# Patient Record
Sex: MALE | Race: ASIAN | Employment: OTHER | URBAN - METROPOLITAN AREA
[De-identification: names, ages, dates, MRNs, and addresses within clinical notes are randomized per-mention and may not be internally consistent; named-entity substitution may affect disease eponyms.]

---

## 2022-12-20 ENCOUNTER — HOSPITAL ENCOUNTER (OUTPATIENT)
Dept: PHYSICAL THERAPY | Age: 82
Discharge: HOME | End: 2022-12-20
Payer: MEDICARE

## 2022-12-20 PROCEDURE — 97535 SELF CARE MNGMENT TRAINING: CPT

## 2022-12-20 PROCEDURE — 97162 PT EVAL MOD COMPLEX 30 MIN: CPT

## 2022-12-20 NOTE — PROGRESS NOTES
[address] PHYSICAL THERAPY  [address] [place] [address] [address], [place], [address] - Phone: [phone]  Fax: [phone] / [address]  Patient Name: Jackie Miranda : 1940   Medical   Diagnosis: Right shoulder pain [M25.511] Treatment Diagnosis: Right shd RTC dysfunction   Onset Date: 2022     Referral Source: Melani Shearer* Start of Care [facility]): 2022   Prior Hospitalization: See medical history Provider #: [id number]   Prior Level of Function: Independent with ADLs and dressing, Amb cautious but no AD   Comorbidities: HTN, Hs of cardiac stent, [place], DM   Medications: Verified on Patient Summary List   The Plan of Care and following information is based on the information from the initial evaluation.   ==========================================================================================  Assessment / key information:  Pt is an [de-identified]year old male who presents to PT today with c/o increased right shd pain with AROM after attempting to place two grocery bags from the  seat into the passenger seat. Due to only mild improvements, pt went to ortho for assessment, x-rays negative. S/S consistent today with RTC dysfunction and will trial conservative PT x6 weeks prior to follow-up with MD for possible MRI. Pt would benefit from skilled PT to address increased pain and decreased strength and ROM limiting dressing tasks and ADLs.   Minimal TTP-referral pain to deltoid                                                       AROM                                           PROM    Left Right   Right   Flexion 145 70 Flexion 140deg   Extension 60 42 Extension NT   Scaption/ 53 Scaption/ABD 90   ER @ 0 Degrees 60 38 ER @ 0 Degrees 50   IR Behind Back T10 NT IR @ 90 Degrees NT    Strength:                                                                      L (1-5) R (1-5) Pain   Flexion 4- NT [x] Yes   [] No   Abduction 4 NT [x] Yes   [] No   External Rotation 4 3 [x] Yes   [] No   Internal Rotation 4 4 [] Yes   [] No   Elbow Flexion 4+ 4+ [] Yes   [] No   Elbow Extension 4+ 4+ [] Yes   [] No     FOTO score 50 indicating 50% functional disability   ==========================================================================================  Eval Complexity: History: HIGH Complexity :3+ comorbidities / personal factors will impact the outcome/ POC Exam:MEDIUM Complexity : 3 Standardized tests and measures addressing body structure, function, activity limitation and / or participation in recreation  Presentation: MEDIUM Complexity : Evolving with changing characteristics  Clinical Decision Making:MEDIUM Complexity : FOTO score of 26-74Overall Complexity:MEDIUM    Problem List: pain affecting function, decrease ROM, decrease strength, decrease ADL/ functional abilitiies, decrease activity tolerance, decrease flexibility/ joint mobility, and decrease transfer abilities   Treatment Plan may include any combination of the following: Therapeutic exercise, Neuromuscular reeducation, Manual therapy, Therapeutic activity, Self care/home management, and Electric stim unattended   Patient / Family readiness to learn indicated by: asking questions, trying to perform skills, and interest  Persons(s) to be included in education: patient (P) and family support person (FSP);list daughter  Barriers to Learning/Limitations: yes;  sensory deficits-hearing  Measures taken: NA   Patient Goal (s): \"Stop the pain when using the arm to reach overhead and sleepin belen the right side\"   Patient self reported health status: fair  Rehabilitation Potential: fair  Short Term Goals: To be accomplished in  2  weeks:  1. Pt will be compliant with HEP for symptom management at home. 2. Pt will demo right shd flex AROM to >/100deg for increased ease reaching to cabinet  3.  Pt will increase right shd abd AROM to >/= 90 deg for incresaed ease dressing. Long Term Goals: To be accomplished in  4  weeks:  1. Pt will be independent with HEP at D/C for self management. 2. Pt will increased right shd ER MMT to >/= 4/5 for increased ease dressing. 3. Pt will increase FS FOTO Score to >/= 60 to indicate a significant decrease in functional disability. Frequency / Duration:   Patient to be seen  2-3  times per week for 4  weeks:  Patient / Caregiver education and instruction: self care, activity modification, and exercises    Therapist Signature: Angel Matamoros DPT Date: 22/65/9820   Certification Period: 12/20/22-3/19/22 Time: 10:03 AM   ===========================================================================================  I certify that the above Physical Therapy Services are being furnished while the patient is under my care. I agree with the treatment plan and certify that this therapy is necessary. Physician Signature:        Date:       Time:     Please sign and return to [facility] or you may fax the signed copy to [phone]. Thank you.

## 2022-12-20 NOTE — PROGRESS NOTES
PHYSICAL THERAPY - DAILY TREATMENT NOTE    Patient Name: Rashawn Sanchez        Date: 2022  : 1940   YES Patient  Verified  Visit #:     Insurance: Payor: Antoinette Yusuf / Plan: [address] [place] [address] HMO / Product Type: Managed Care Medicare /      In time: 10:15 Out time: 11:15   Total Treatment Time: 60     Medicare/BCBS [place] Time Tracking (below)   Total Timed Codes (min):  8 1:1 Treatment Time:  8     TREATMENT AREA =  Right shd    SUBJECTIVE    Pain Level (on 0 to 10 scale):  0  / 10   Medication Changes/New allergies or changes in medical history, any new surgeries or procedures?     NO    If yes, update Summary List   Subjective Functional Status/Changes:  []  No changes reported     Tried to lift two heavy grocery bags from  seat to passenger seat   Sharp pain  Early November  X-ray negative   No pain medication or injection, Volaren cream helps some  No heat of ice  Slightly better  Pain with reaching up and liein belen that side  Noticing some ROM improvement   Getting dressed difficulty, Right handed   Reading        OBJECTIVE    8 min Self Care  [x]  See flow sheet   Rationale:      increase ROM and impove ADL tolerance  to improve the patients ability to per ADLs with less discomfort      min Patient Education:  YES  Reviewed HEP   []  Progressed/Changed HEP based on:   Issued HEP     Other Objective/Functional Measures:  Minimal TTP-referral pain to deltoid          AROM                                           PROM   Left Right  Right   Flexion 145 70 Flexion 140deg   Extension 60 42 Extension NT   Scaption/ 53 Scaption/ABD 90   ER @ 0 Degrees 60 38 ER @ 0 Degrees 50   IR Behind Back T10 NT IR @ 90 Degrees NT    Strength:                                                                     L (1-5) R (1-5) Pain   Flexion 4- NT [x] Yes   [] No   Abduction 4 NT [x] Yes   [] No   External Rotation 4 3 [x] Yes   [] No   Internal Rotation 4 4 [] Yes   [] No Elbow Flexion 4+ 4+ [] Yes   [] No   Elbow Extension 4+ 4+ [] Yes   [] No            Post Treatment Pain Level (on 0 to 10) scale:   0  / 10     ASSESSMENT    Assessment/Changes in Function:     Justification for Eval Code Complexity: Moderate  Patient History (low 0, mod 1-2, high 3-4): High   Examination (low 1-2, mod 3+, high 4+): Moderate   Clinical Presentation (low: stable/uncomplicated; mod: evolving; high: unstable/unpredictable): Moderate  Clinical Decision Making (low , mod 26-74, high 1-25): Moderate         []  See Progress Note/Recertification   Patient will continue to benefit from skilled PT services to analyze,, cue,, progress,, modify,, demonstrate,, instruct, and address, movement patterns,, therapeutic interventions,, postural abnormalities,, soft tissue restrictions,, ROM,, strength,, functional mobility,, body mechanics/ergonomics, and home and community integration, to attain remaining goals. Progress toward goals / Updated goals:    Goals set per POC     PLAN    []  Upgrade activities as tolerated YES Continue plan of care   []  Discharge due to :    []  Other:      Therapist: Shant Kwon DPT    Date: 12/20/2022 Time: 10:02 AM   No future appointments.

## 2022-12-23 ENCOUNTER — HOSPITAL ENCOUNTER (OUTPATIENT)
Dept: PHYSICAL THERAPY | Age: 82
Discharge: HOME | End: 2022-12-23
Payer: MEDICARE

## 2022-12-23 PROCEDURE — 97110 THERAPEUTIC EXERCISES: CPT

## 2022-12-23 NOTE — PROGRESS NOTES
PHYSICAL THERAPY - DAILY TREATMENT NOTE    Patient Name: Ronnie Ulloa        Date: 2022  : 1940   YES Patient  Verified  Visit #:     Insurance: Payor: Woodrow Rees / Plan: [address] [place] [address] HMO / Product Type: Managed Care Medicare /      In time: 9:00 Out time: 9:45   Total Treatment Time: 45     Medicare/BCBS [place] Time Tracking (below)   Total Timed Codes (min):  45 1:1 Treatment Time:  45     TREATMENT AREA =  Right shd    SUBJECTIVE    Pain Level (on 0 to 10 scale):  0  / 10   Medication Changes/New allergies or changes in medical history, any new surgeries or procedures? NO    If yes, update Summary List   Subjective Functional Status/Changes:  []  No changes reported     \"I feel like it is a little better\"          OBJECTIVE    45 min Therapeutic Exercise:  [x]  See flow sheet   Rationale:      increase ROM and increase strength to improve the patients ability to Maximize functional mobility         min Patient Education:  YES  Reviewed HEP   []  Progressed/Changed HEP based on:   Issued HEP     Other Objective/Functional Measures:  Pt reported he liked the puleys and could feel a good stretch  Only mild c/o pain with eccentric lowering during cane flex, abd bree, and side lying ER  Instructed pt to decreased force of contraction and decrease ROM to avoid discomfort     Post Treatment Pain Level (on 0 to 10) scale:   0  / 10     ASSESSMENT    Assessment/Changes in Function:     Tolerated first session well     []  See Progress Note/Recertification   Patient will continue to benefit from skilled PT services to analyze, cue, progress, modify,, demonstrate, instruct, and address, movement patterns, therapeutic interventions, postural abnormalities, soft tissue restrictions, ROM, strength, functional mobility, body mechanics/ergonomics, and home and community integration, to attain remaining goals.    Progress toward goals / Updated goals:    Issued HEP PLAN    []  Upgrade activities as tolerated YES Continue plan of care   []  Discharge due to :    []  Other:      Therapist: Chang Vicente DPT    Date: 12/23/2022 Time: 11:37 AM     Future Appointments   Date Time Provider Christina Rosai   12/27/2022  1:15 PM Payal Cates, PT [facility]   12/29/2022  1:15 PM Payal Cates, PT [facility]   1/3/2023 11:30 AM Payal Cates, PT [facility]   1/5/2023  9:30 AM Payal Cates, PT [facility]   1/6/2023 10:00 AM Payal Cates, PT [facility]   1/9/2023 11:00 AM Payal Cates, PT [facility]   1/11/2023  1:00 PM Christopher Yarbrough PT [facility]   1/13/2023 10:00 AM Payal Cates, PT [facility]   1/16/2023 11:30 AM Payal Cates PT [facility]   1/18/2023  9:30 AM Payal Cates PT [facility]   1/20/2023 10:00 AM Payal Cates PT [facility]   1/23/2023  9:30 AM Payal Cates PT [facility]   1/25/2023 11:00 AM Payal Cates PT [facility]   1/27/2023 10:00 AM Payal Cates PT [facility]

## 2022-12-27 ENCOUNTER — HOSPITAL ENCOUNTER (OUTPATIENT)
Dept: PHYSICAL THERAPY | Age: 82
Discharge: HOME | End: 2022-12-27
Payer: MEDICARE

## 2022-12-27 PROCEDURE — 97530 THERAPEUTIC ACTIVITIES: CPT

## 2022-12-27 PROCEDURE — 97110 THERAPEUTIC EXERCISES: CPT

## 2022-12-27 NOTE — PROGRESS NOTES
PHYSICAL THERAPY - DAILY TREATMENT NOTE    Patient Name: Adalberto Oden        Date: 2022  : 1940   YES Patient  Verified  Visit #:   3   of   12  Insurance: Payor: Dawna Scott / Plan: [address] [place] [address] HMO / Product Type: Managed Care Medicare /      In time: 1:05 Out time: 1:50   Total Treatment Time: 45     Medicare/BCBS Time Tracking (below)   Total Timed Codes (min):  45 1:1 Treatment Time:  45     TREATMENT AREA = Right shoulder pain [M25.511]    SUBJECTIVE    Pain Level (on 0 to 10 scale):  0  / 10   Medication Changes/New allergies or changes in medical history, any new surgeries or procedures? NO    If yes, update Summary List   Subjective Functional Status/Changes:  []  No changes reported     \"It's feeling a lot better even after just one session\"          OBJECTIVE     Therapeutic Procedures:  Min Procedure Specifics + Rationale   n/a [x]  Patient Education (performed throughout session) [x] Review HEP    [] Progressed/Changed HEP based on:   [] proper performance and advancement of Therex/TA   [] reduction in pain level    [] increased functional capacity       [] change in directional preference   35 [x] Therapeutic Exercise   [x]  See Flowsheet   Rationale: increase ROM and increase strength to improve the patients ability to participate in ADL's    10 [x] Therapeutic Activity   [x]  See Flowsheet    Rationale: To improve safety, proprioception, coordination, and efficiency with tasks           Other Objective/Functional Measures: Added and advanced therex per flow sheet.        Post Treatment Pain Level (on 0 to 10) scale:   0  / 10     ASSESSMENT    Assessment/Changes in Function:       Performed all new therex well without notable complaints         Patient will continue to benefit from skilled PT services to modify and progress therapeutic interventions, address functional mobility deficits, address ROM deficits, address strength deficits, analyze and address soft tissue restrictions, analyze and cue movement patterns, analyze and modify body mechanics/ergonomics, and instruct in home and community integration  to attain remaining goals   Progress toward goals / Updated goals:    Responding well to treatment protocol as patient reports no notable pain     PLAN    [x]  Upgrade activities as tolerated  [x]  Update interventions per flow sheet YES Continue plan of care   []  Discharge due to :    []  Other:      Therapist: Tapan Members \"BJ\" Elsa, LEWIST, Cert. MDT, Cert. DN, Cert. SMT, Dip.  [employer]    Date: 12/27/2022 Time: 1:21 PM     Future Appointments   Date Time Provider Christina Hawk   12/29/2022  1:15 PM Jignesh Marsh, PT [facility]   1/3/2023 11:30 AM Jignesh Marsh, PT [facility]   1/5/2023  9:30 AM Jignesh Marsh, PT [facility]   1/6/2023 10:00 AM Jignesh Marsh, PT [facility]   1/9/2023 11:00 AM Jignesh Marsh, PT [facility]   1/11/2023  1:00 PM Allison Carlos PT [facility]   1/13/2023 10:00 AM Jignesh Marsh, PT [facility]   1/16/2023 11:30 AM Jignesh Marsh PT [facility]   1/18/2023  9:30 AM Jignesh Marsh, PT [facility]   1/20/2023 10:00 AM Jignesh Marsh PT [facility]   1/23/2023  9:30 AM Jignesh Marsh, PT [facility]   1/25/2023 11:00 AM ZOFIA Hampton [facility]   1/27/2023 10:00 AM ZOFIA Hampton [facility]

## 2022-12-29 ENCOUNTER — HOSPITAL ENCOUNTER (OUTPATIENT)
Dept: PHYSICAL THERAPY | Age: 82
Discharge: HOME | End: 2022-12-29
Payer: MEDICARE

## 2022-12-29 PROCEDURE — 97530 THERAPEUTIC ACTIVITIES: CPT

## 2022-12-29 PROCEDURE — 97110 THERAPEUTIC EXERCISES: CPT

## 2022-12-29 NOTE — PROGRESS NOTES
PHYSICAL THERAPY - DAILY TREATMENT NOTE    Patient Name: Lito Bolton        Date: 2022  : 1940   YES Patient  Verified  Visit #:     Insurance: Payor: Zoltan Holland / Plan: [address] [place] [address] HMO / Product Type: Managed Care Medicare /      In time: 1:05 Out time: 1:45   Total Treatment Time: 40     Medicare/BCBS Time Tracking (below)   Total Timed Codes (min):  40 1:1 Treatment Time:  40     TREATMENT AREA = Right shoulder pain [M25.511]    SUBJECTIVE    Pain Level (on 0 to 10 scale):  0  / 10   Medication Changes/New allergies or changes in medical history, any new surgeries or procedures? NO    If yes, update Summary List   Subjective Functional Status/Changes:  []  No changes reported     \"The exercises feel good. Especially the pulleys. I'm leaving to visit my niece in [place] tomorrow for [employer]. \"          OBJECTIVE     Therapeutic Procedures:  Min Procedure Specifics + Rationale   n/a [x]  Patient Education (performed throughout session) [x] Review HEP    [] Progressed/Changed HEP based on:   [] proper performance and advancement of Therex/TA   [] reduction in pain level    [] increased functional capacity       [] change in directional preference   30 [x] Therapeutic Exercise   [x]  See Flowsheet   Rationale: increase ROM and increase strength to improve the patients ability to participate in ADL's    10 [x] Therapeutic Activity   [x]  See Flowsheet    Rationale: To improve safety, proprioception, coordination, and efficiency with tasks           Other Objective/Functional Measures:    Increased reps/sets/resistance per flow sheet.        Post Treatment Pain Level (on 0 to 10) scale:   0  / 10     ASSESSMENT    Assessment/Changes in Function:       Performed familiar therex well without complaints         Patient will continue to benefit from skilled PT services to modify and progress therapeutic interventions, address functional mobility deficits, address ROM deficits, address strength deficits, analyze and address soft tissue restrictions, analyze and cue movement patterns, analyze and modify body mechanics/ergonomics, and instruct in home and community integration  to attain remaining goals   Progress toward goals / Updated goals:    Good carryover in relief between sessions to facilitate ADL tolerance     PLAN    [x]  Upgrade activities as tolerated  [x]  Update interventions per flow sheet YES Continue plan of care   []  Discharge due to :    []  Other:      Therapist: Rosa Parnell \"BJ\" Elsa, LALO, Cert. MDT, Cert. DN, Cert. SMT, Dip.  Osteopractic    Date: 12/29/2022 Time: 1:22 PM     Future Appointments   Date Time Provider Christina Hawk   1/3/2023 11:30 AM Med Brower, PT [facility]   1/5/2023  9:30 AM Med Brower, PT [facility]   1/6/2023 10:00 AM Med Brower, PT [facility]   1/9/2023 11:00 AM Med Brower PT [facility]   1/11/2023  1:00 PM Iram Escalera, PT [facility]   1/13/2023 10:00 AM Med Brower PT [facility]   1/16/2023 11:30 AM Med Brower PT [facility]   1/18/2023  9:30 AM Med Brower PT [facility]   1/20/2023 10:00 AM Med Brower PT [facility]   1/23/2023  9:30 AM Med Brower PT [facility]   1/25/2023 11:00 AM Med Brower, PT [facility]   1/27/2023 10:00 AM ZOFIA Cole [facility]

## 2023-01-03 ENCOUNTER — HOSPITAL ENCOUNTER (OUTPATIENT)
Dept: PHYSICAL THERAPY | Age: 83
Discharge: HOME | End: 2023-01-03
Payer: MEDICARE

## 2023-01-03 PROCEDURE — 97530 THERAPEUTIC ACTIVITIES: CPT

## 2023-01-03 PROCEDURE — 97110 THERAPEUTIC EXERCISES: CPT

## 2023-01-03 NOTE — PROGRESS NOTES
PHYSICAL THERAPY - DAILY TREATMENT NOTE    Patient Name: Justo Carpenter        Date: 1/3/2023  : 1940   YES Patient  Verified  Visit #:     Insurance: Payor: Hans Racer / Plan: [place] MED[place] PART A & B / Product Type: Medicare /      In time: 11:31 Out time: 12:10   Total Treatment Time: 39     Medicare/BCBS Time Tracking (below)   Total Timed Codes (min):  39 1:1 Treatment Time:  39     TREATMENT AREA = Right shoulder pain [M25.511]    SUBJECTIVE    Pain Level (on 0 to 10 scale):  0  / 10   Medication Changes/New allergies or changes in medical history, any new surgeries or procedures? NO    If yes, update Summary List   Subjective Functional Status/Changes:  []  No changes reported     \"The trip to [place] was fine. I didn't have any extra pain in my shoulder\"          OBJECTIVE     Therapeutic Procedures:  Min Procedure Specifics + Rationale   n/a [x]  Patient Education (performed throughout session) [x] Review HEP    [] Progressed/Changed HEP based on:   [] proper performance and advancement of Therex/TA   [] reduction in pain level    [] increased functional capacity       [] change in directional preference   23 [x] Therapeutic Exercise   [x]  See Flowsheet   Rationale: increase ROM and increase strength to improve the patients ability to participate in ADL's    16 [x] Therapeutic Activity   [x]  See Flowsheet    Rationale: To improve safety, proprioception, coordination, and efficiency with tasks         Other Objective/Functional Measures: Added and advanced therex per flow sheet. Increased reps/sets/resistance per flow sheet. Transient pain during wall wash     Post Treatment Pain Level (on 0 to 10) scale:   0  / 10     ASSESSMENT    Assessment/Changes in Function:       Performed/participated in treatment well without complaints aside from muscular fatigue/deconditioning, indicating (+) response to current course of treatment to further improve functional status. Patient will continue to benefit from skilled PT services to modify and progress therapeutic interventions, address functional mobility deficits, address ROM deficits, address strength deficits, analyze and address soft tissue restrictions, analyze and cue movement patterns, analyze and modify body mechanics/ergonomics, and instruct in home and community integration  to attain remaining goals   Progress toward goals / Updated goals:    Good carryover in relief. PLAN    [x]  Upgrade activities as tolerated  [x]  Update interventions per flow sheet YES Continue plan of care   []  Discharge due to :    []  Other:      Therapist: Finn Betancourt \"BJ\" Elsa, DPT, Cert. MDT, Cert. DN, Cert. SMT, Dip.  Osteopractic    Date: 1/3/2023 Time: 11:29 AM     Future Appointments   Date Time Provider Christina Hawk   1/3/2023 11:30 AM Evonne Collins, PT [facility]   1/5/2023  9:30 AM Evonne Collins, PT [facility]   1/6/2023 10:00 AM Evonne Collins, PT [facility]   1/9/2023 11:00 AM Evonne Collins, PT [facility]   1/11/2023  1:00 PM Claudia Shelley PT [facility]   1/13/2023 10:00 AM Evonne Collins, PT [facility]   1/16/2023 11:30 AM Evonne Collins PT [facility]   1/18/2023  9:30 AM Evonne Collins PT [facility]   1/20/2023 10:00 AM Evonne Collins PT [facility]   1/23/2023  9:30 AM Evonne Collins, PT [facility]   1/25/2023 11:00 AM ZOFIA Brooke [facility]   1/27/2023 10:00 AM ZOFIA Brooke [facility]

## 2023-01-05 ENCOUNTER — HOSPITAL ENCOUNTER (OUTPATIENT)
Dept: PHYSICAL THERAPY | Age: 83
Discharge: HOME | End: 2023-01-05
Payer: MEDICARE

## 2023-01-05 PROCEDURE — 97140 MANUAL THERAPY 1/> REGIONS: CPT

## 2023-01-05 PROCEDURE — 97014 ELECTRIC STIMULATION THERAPY: CPT

## 2023-01-05 PROCEDURE — 97110 THERAPEUTIC EXERCISES: CPT

## 2023-01-05 NOTE — PROGRESS NOTES
PHYSICAL THERAPY - DAILY TREATMENT NOTE    Patient Name: Regi Milian        Date: 2023  : 1940   YES Patient  Verified  Visit #:     Insurance: Payor: Domingo Alt / Plan: [place] MEDICARE PART A & B / Product Type: Medicare /      In time: 9:25 Out time: 10:20   Total Treatment Time: 55     Medicare/BCBS Time Tracking (below)   Total Timed Codes (min):  55 1:1 Treatment Time:  38     TREATMENT AREA = Right shoulder pain [M25.511]    SUBJECTIVE    Pain Level (on 0 to 10 scale):  5  / 10   Medication Changes/New allergies or changes in medical history, any new surgeries or procedures? NO    If yes, update Summary List   Subjective Functional Status/Changes:  []  No changes reported     \"I'm very sore from last time. \"          OBJECTIVE     Therapeutic Procedures:  Min Procedure Specifics + Rationale   n/a [x]  Patient Education (performed throughout session) [x] Review HEP    [] Progressed/Changed HEP based on:   [] proper performance and advancement of Therex/TA   [] reduction in pain level    [] increased functional capacity       [] change in directional preference   25 [x] Therapeutic Exercise   [x]  See Flowsheet   Rationale: increase ROM and increase strength to improve the patients ability to participate in ADL's    15 [x]  Manual Therapy       [] IASTM -   [x] STM to periscapular mm; [address] G3 GH Distraction; G3-4 inferior/posterior mobs;  PROM all planes; Scapular PNF; Shoulder PNF Patterns; Rhythmic Stabilization. Rationale: decrease pain, increase ROM, increase tissue extensibility, decrease trigger points and increase postural awareness to attain functional use and participation with ADL's  [x] Skin assessment post-treatment:  [x]intact [x]redness- no adverse reaction   []redness - adverse  The manual therapy interventions were performed at a separate and distinct time from the therapeutic activities interventions.          Modality rationale: decrease inflammation, decrease pain, increase tissue extensibility and increase muscle contraction/control to improve the patients ability to perform ADL's with greater ease     Min Type Additional Details   15 [x] E-Stim Type:Symmetrical Biphasic  Attended? 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 Post Treatment Pain Level (on 0 to 10) scale:   0  / 10     ASSESSMENT    Assessment/Changes in Function:       Responded well to manual as patient reported reduction in pain with performance. Patient will continue to benefit from skilled PT services to modify and progress therapeutic interventions, address functional mobility deficits, address ROM deficits, address strength deficits, analyze and address soft tissue restrictions, analyze and cue movement patterns, analyze and modify body mechanics/ergonomics, and instruct in home and community integration  to attain remaining goals   Progress toward goals / Updated goals:    Despite exacerbation, right shoulder AROM flexion has improved      PLAN    [x]  Upgrade activities as tolerated  [x]  Update interventions per flow sheet YES Continue plan of care   []  Discharge due to :    []  Other:      Therapist: Allan Vaughan \"BJ\" LALO Hunter, Cert. MDT, Cert. DN, Cert. SMT, Dip.  Osteopractic    Date: 1/5/2023 Time: 9:33 AM     Future Appointments   Date Time Provider Christina Hawk   1/6/2023 10:00 AM Bar Botello PT [facility]   1/9/2023 11:00 AM Bar Botello PT [facility]   1/11/2023  1:00 PM Brijesh Rodriguez PT [facility]   1/13/2023 10:00 AM Bar Botello PT [facility]   1/16/2023 11:30 AM [facility] 1/18/2023  9:30 AM Renea Sweeney, PT [facility]   1/20/2023 10:00 AM Renea Sweeney, PT [facility]   1/23/2023  9:30 AM Renea Sweeney, PT [facility]   1/25/2023 11:00 AM Renea Sweeney, PT [facility]   1/27/2023 10:00 AM Renea Sweeney, PT [facility]

## 2023-01-06 ENCOUNTER — HOSPITAL ENCOUNTER (OUTPATIENT)
Dept: PHYSICAL THERAPY | Age: 83
Discharge: HOME | End: 2023-01-06
Payer: MEDICARE

## 2023-01-06 PROCEDURE — 97014 ELECTRIC STIMULATION THERAPY: CPT

## 2023-01-06 PROCEDURE — 97140 MANUAL THERAPY 1/> REGIONS: CPT

## 2023-01-06 PROCEDURE — 97110 THERAPEUTIC EXERCISES: CPT

## 2023-01-06 NOTE — PROGRESS NOTES
PHYSICAL THERAPY - DAILY TREATMENT NOTE    Patient Name: Tiana Chinchilla        Date: 2023  : 1940   YES Patient  Verified  Visit #:     Insurance: Payor: Breana Rush / Plan: [place] MEDICARE PART A & B / Product Type: Medicare /      In time: 9:55 Out time: 10:55   Total Treatment Time: 60     Medicare/BCBS Time Tracking (below)   Total Timed Codes (min):  60 1:1 Treatment Time:  45     TREATMENT AREA = Right shoulder pain [M25.511]    SUBJECTIVE    Pain Level (on 0 to 10 scale):  3  / 10   Medication Changes/New allergies or changes in medical history, any new surgeries or procedures? NO    If yes, update Summary List   Subjective Functional Status/Changes:  []  No changes reported     \"It's better than before. Not as sore. The ice helped last time. \"           OBJECTIVE     Therapeutic Procedures:  Min Procedure Specifics + Rationale   n/a [x]  Patient Education (performed throughout session) [x] Review HEP    [] Progressed/Changed HEP based on:   [] proper performance and advancement of Therex/TA   [] reduction in pain level    [] increased functional capacity       [] change in directional preference   30 [x] Therapeutic Exercise   [x]  See Flowsheet   Rationale: increase ROM and increase strength to improve the patients ability to participate in ADL's    15 [x]  Manual Therapy       [] IASTM -   [x] STM to periscapular mm; [address] G3 GH Distraction; G3-4 inferior/posterior mobs;  PROM all planes; Scapular PNF; Shoulder PNF Patterns; Rhythmic Stabilization. Rationale: decrease pain, increase ROM, increase tissue extensibility, decrease trigger points and increase postural awareness to attain functional use and participation with ADL's  [x] Skin assessment post-treatment:  [x]intact [x]redness- no adverse reaction   []redness - adverse  The manual therapy interventions were performed at a separate and distinct time from the therapeutic activities interventions.          Modality rationale: decrease inflammation, decrease pain, increase tissue extensibility and increase muscle contraction/control to improve the patients ability to perform ADL's with greater ease     Min Type Additional Details   15 [x] E-Stim Type:Symmetrical Biphasic  Attended? NO Location: right shoulder  [x] supine              [] prone  [] legs elevated   [] legs flat  [] sitting   [] sidelying - [] left [] right  [] with heat  [x] with ice  [] Vasopneumatic Device    [x] Skin assessment post-treatment:  [x]intact [x]redness- no adverse reaction       []redness - adverse reaction:       Other Objective/Functional Measures:    Trial of repeated shoulder extension     Post Treatment Pain Level (on 0 to 10) scale:   0  / 10     ASSESSMENT    Assessment/Changes in Function:       Responds well to repeated movements         Patient will continue to benefit from skilled PT services to modify and progress therapeutic interventions, address functional mobility deficits, address ROM deficits, address strength deficits, analyze and address soft tissue restrictions, analyze and cue movement patterns, analyze and modify body mechanics/ergonomics, and instruct in home and community integration  to attain remaining goals   Progress toward goals / Updated goals:    Good progress in overhead reaching tolerance     PLAN    [x]  Upgrade activities as tolerated  [x]  Update interventions per flow sheet YES Continue plan of care   []  Discharge due to :    []  Other:      Therapist: Leonard Kessler \"BJ\" Callie Holt DPT, Cert. MDT, Cert. DN, Cert. SMT, Dip.  Osteopractic    Date: 1/6/2023 Time: 10:12 AM     Future Appointments   Date Time Provider Christina Hawk   1/9/2023 11:00 AM Marisabel Grover PT [facility]   1/11/2023  1:00 PM Cecily Spatz, PT [facility]   1/13/2023 10:00 AM Marisabel Grover PT [facility]   1/16/2023 11:30 AM Marisabel Grover PT [facility]   1/18/2023  9:30 AM Marisabel Grover PT [facility]   1/20/2023 10:00 AM Marisabel Grover PT [facility] 1/23/2023  9:30 AM Erum Jade PT [facility]   1/25/2023 11:00 AM ZOFIA Nicholson [facility]   1/27/2023 10:00 AM ZOFIA Nicholson [facility]

## 2023-01-09 ENCOUNTER — HOSPITAL ENCOUNTER (OUTPATIENT)
Dept: PHYSICAL THERAPY | Age: 83
Discharge: HOME | End: 2023-01-09
Payer: MEDICARE

## 2023-01-09 PROCEDURE — 97110 THERAPEUTIC EXERCISES: CPT

## 2023-01-09 PROCEDURE — 97014 ELECTRIC STIMULATION THERAPY: CPT

## 2023-01-09 PROCEDURE — 97140 MANUAL THERAPY 1/> REGIONS: CPT

## 2023-01-09 NOTE — PROGRESS NOTES
PHYSICAL THERAPY - DAILY TREATMENT NOTE    Patient Name: Marika Tijerina        Date: 2023  : 1940   YES Patient  Verified  Visit #:     Insurance: Payor: Sydnie Nones / Plan: [place] MEDICARE PART A & B / Product Type: Medicare /      In time: 10:50 Out time: 11:50   Total Treatment Time: 60     Medicare/BCBS Time Tracking (below)   Total Timed Codes (min):  60 1:1 Treatment Time:  45     TREATMENT AREA = Right shoulder pain [M25.511]    SUBJECTIVE    Pain Level (on 0 to 10 scale):  3  / 10   Medication Changes/New allergies or changes in medical history, any new surgeries or procedures? NO    If yes, update Summary List   Subjective Functional Status/Changes:  []  No changes reported     \"It's better but comes back when I reach\"          OBJECTIVE     Therapeutic Procedures:  Min Procedure Specifics + Rationale   n/a [x]  Patient Education (performed throughout session) [x] Review HEP    [] Progressed/Changed HEP based on:   [] proper performance and advancement of Therex/TA   [] reduction in pain level    [] increased functional capacity       [] change in directional preference   30 [x] Therapeutic Exercise   [x]  See Flowsheet   Rationale: increase ROM and increase strength to improve the patients ability to participate in ADL's    15 [x]  Manual Therapy       [] IASTM -   [x] STM to periscapular mm; [address] G3 GH Distraction; G3-4 inferior/posterior mobs;  PROM all planes; Scapular PNF; Shoulder PNF Patterns; Rhythmic Stabilization. Rationale: decrease pain, increase ROM, increase tissue extensibility, decrease trigger points and increase postural awareness to attain functional use and participation with ADL's  [x] Skin assessment post-treatment:  [x]intact [x]redness- no adverse reaction   []redness - adverse  The manual therapy interventions were performed at a separate and distinct time from the therapeutic activities interventions.          Modality rationale: decrease inflammation, decrease pain, increase tissue extensibility and increase muscle contraction/control to improve the patients ability to perform ADL's with greater ease     Min Type Additional Details   15 [x] E-Stim Type:Symmetrical Biphasic  Attended? NO Location: right shoulder  [x] supine              [] prone  [x] legs elevated   [] legs flat  [] sitting   [] sidelying - [] left [] right  [] with heat  [x] with ice  [] Vasopneumatic Device    [x] Skin assessment post-treatment:  [x]intact [x]redness- no adverse reaction       []redness - adverse reaction:       Other Objective/Functional Measures:    Increased reps/sets/resistance per flow sheet. Post Treatment Pain Level (on 0 to 10) scale:   0-1  / 10     ASSESSMENT    Assessment/Changes in Function:       Improved ER tolerance during manual         Patient will continue to benefit from skilled PT services to modify and progress therapeutic interventions, address functional mobility deficits, address ROM deficits, address strength deficits, analyze and address soft tissue restrictions, analyze and cue movement patterns, analyze and modify body mechanics/ergonomics, and instruct in home and community integration  to attain remaining goals   Progress toward goals / Updated goals:    Flexion tolerance continues to improve     PLAN    [x]  Upgrade activities as tolerated  [x]  Update interventions per flow sheet YES Continue plan of care   []  Discharge due to :    []  Other:      Therapist: Anaid Burrows \"BJ\" Nicolas Ann, LEWIST, Cert. MDT, Cert. DN, Cert. SMT, Dip.  Osteopractic    Date: 1/9/2023 Time: 10:53 AM     Future Appointments   Date Time Provider Christina Hawk   1/9/2023 11:00 AM Nathan Ly, PT [facility]   1/11/2023  1:00 PM Carlos Rodriguez PT [facility]   1/13/2023 10:00 AM Nathan Ly, PT [facility]   1/16/2023 11:30 AM Nathan Ly PT [facility]   1/18/2023  9:30 AM Nathan Ly, PT [facility]   1/20/2023 10:00 AM Nathan Ly, [facility] 1/23/2023  9:30 AM Demetrius Reyes PT [facility]   1/25/2023 11:00 AM ZOFIA Mclaughlin [facility]   1/27/2023 10:00 AM ZOFIA Mclaughlin [facility]

## 2023-01-11 ENCOUNTER — HOSPITAL ENCOUNTER (OUTPATIENT)
Dept: PHYSICAL THERAPY | Age: 83
Discharge: HOME | End: 2023-01-11
Payer: MEDICARE

## 2023-01-11 PROCEDURE — 97110 THERAPEUTIC EXERCISES: CPT

## 2023-01-11 NOTE — PROGRESS NOTES
PHYSICAL THERAPY - DAILY TREATMENT NOTE    Patient Name: Winifred Cannon        Date: 2023  : 1940   YES Patient  Verified  Visit #:     Insurance: Payor: Med Peraza / Plan: [place] MEDICARE PART A & B / Product Type: Medicare /      In time: 1:00 Out time: 1:40   Total Treatment Time: 40     Medicare/BCBS [place] Time Tracking (below)   Total Timed Codes (min):  30 1:1 Treatment Time:  30     TREATMENT AREA =  R RC    SUBJECTIVE    Pain Level (on 0 to 10 scale):  3  / 10   Medication Changes/New allergies or changes in medical history, any new surgeries or procedures? NO    If yes, update Summary List   Subjective Functional Status/Changes:  []  No changes reported     \"At one point I could press here and not feel pain, but now it is a little sore.  Maybe I expected it to be better to soon\"         OBJECTIVE  Modalities Rationale:    decrease inflammation and decrease pain to improve patient's ability to decrease post exercise soreness   min [] Estim, type/location:                                      []  att     []  unatt     []  w/US     []  w/ice    []  w/heat    min []  Mechanical Traction: type/lbs                   []  pro   []  sup   []  int   []  cont    []  before manual    []  after manual    min []  Ultrasound, settings/location:      min []  Iontophoresis w/ dexamethasone, location:                                               []  take home patch-6hour remove at        []  in clinic   10 min [x]  Ice     []  Heat    location/position: Supine with wedge    min []  Vasopneumatic Device, press/temp:     min []  Other:    [x] Skin assessment post-treatment (if applicable):    [x]  intact    []  redness- no adverse reaction     []redness - adverse reaction:      30 min Therapeutic Exercise:  [x]  See flow sheet   Rationale:      increase ROM and increase strength to improve the patients ability to Maximize functional mobility         min Patient Education:  YES  Reviewed HEP []  Progressed/Changed HEP based on:   Cont HEP     Other Objective/Functional Measures:    Mild report of discomfort with OH reach  Added elbow flex today with TBand      Post Treatment Pain Level (on 0 to 10) scale:   0  / 10     ASSESSMENT    Assessment/Changes in Function:     Reassess NV for MD note  Pt reported he preferred only ice compared to estim with ice     []  See Progress Note/Recertification   Patient will continue to benefit from skilled PT services to analyze, cue, progress, modify,, demonstrate, instruct, and address, movement patterns, therapeutic interventions, postural abnormalities, soft tissue restrictions, ROM, strength, functional mobility, body mechanics/ergonomics, and home and community integration, to attain remaining goals.    Progress toward goals / Updated goals:    Reassess NV for MD note     PLAN    []  Upgrade activities as tolerated YES Continue plan of care   []  Discharge due to :    []  Other:      Therapist: Vidya Ireland DPT    Date: 1/11/2023 Time: 1:03 PM     Future Appointments   Date Time Provider Christina Hawk   1/13/2023 10:00 AM Sudha Werner PT [facility]   1/16/2023 11:30 AM Sudha Werner PT [facility]   1/18/2023  9:30 AM Sudha Werner PT [facility]   1/20/2023 10:00 AM Sudha Werner PT [facility]   1/23/2023  9:30 AM Sudha Werner PT [facility]   1/25/2023 11:00 AM Sudha Werner PT [facility]   1/27/2023 10:00 AM Sudha Werner PT [facility]

## 2023-01-13 ENCOUNTER — HOSPITAL ENCOUNTER (OUTPATIENT)
Dept: PHYSICAL THERAPY | Age: 83
Discharge: HOME | End: 2023-01-13
Payer: MEDICARE

## 2023-01-13 PROCEDURE — 97014 ELECTRIC STIMULATION THERAPY: CPT

## 2023-01-13 PROCEDURE — 97110 THERAPEUTIC EXERCISES: CPT

## 2023-01-13 PROCEDURE — 97530 THERAPEUTIC ACTIVITIES: CPT

## 2023-01-13 NOTE — PROGRESS NOTES
[facility] PHYSICAL THERAPY  [address] [place] [address] Nazia Anaya, [employer] [address] - Phone: [phone]  Fax: [phone]  PROGRESS NOTE  Patient Name: Alden White : 1940   Treatment/Medical Diagnosis: Right shoulder pain [M25.511]   Referral Source: Jase Koo*     Date of Initial Visit: 22 Attended Visits: 10 Missed Visits: 0     SUMMARY OF TREATMENT  Patient's POC has consisted of therex, therapeutic activities, manual therapy prn, modalities prn, pt. education, and a comprehensive HEP. Treatment strategies used to address functional mobility deficits, ROM deficits, strength deficits, analyze and address soft tissue restrictions, analyze and cue movement patterns, analyze and modify body mechanics/ergonomics, assess and modify postural abnormalities and instruct in home and community integration. CURRENT STATUS  Patient making steady progress, denying resting pain. Symptoms are 2/10 at worst, with increase in AROM flexion to 110 degrees and abduction to 95 degrees. Patient reports 75% improvement in symptoms since [facility]. Goal/Measure of Progress Goal Met? Short Term Goals:   1. Pt will be compliant with HEP for symptom management at home. 2. Pt will demo right shd flex AROM to >/100deg for increased ease reaching to cabinet  3. Pt will increase right shd abd AROM to >/= 90 deg for incresaed ease dressing. MET    MET      MET     New Goals to be achieved in __4__  weeks:  1. Pt will be independent with HEP at D/C for self management. 2. Pt will increased right shd ER MMT to >/= 4/5 for increased ease dressing. 3. Pt will increase FS FOTO Score to >/= 60 to indicate a significant decrease in functional disability. Frequency / Duration:   Patient to be seen  2  times per week for 4  weeks:    RECOMMENDATIONS  Continue and progress functional therex/therapeutic activity as able, utilizing manual therapy and modalities prn. Progress patient towards independent HEP to facilitate self-management of symptoms and progress gains after PT. If you have any questions/comments please contact us directly at [phone]. Thank you for allowing us to assist in the care of your patient. Therapist Signature: Adeel Miranda \"CORRINA\" Stephane Price, LALO, Cert. MDT, Cert. DN, Cert. SMT, Dip. Osteopractic Date: 9/48/1103   Certification Period: 12/20/22-3/19/23     Reporting Period 12/20/22-1/13/23   Time: 10:13 AM   NOTE TO PHYSICIAN:  PLEASE COMPLETE THE ORDERS BELOW AND FAX TO   [facility]: [phone]. If you are unable to process this request in 24 hours please contact our office: [phone].    ___ I have read the above report and request that my patient continue as recommended.   ___ I have read the above report and request that my patient continue therapy with the following changes/special instructions:_________________________________________________________   ___ I have read the above report and request that my patient be discharged from therapy.      Physician Signature:        Date:       Time:                                        Hector Genao

## 2023-01-13 NOTE — PROGRESS NOTES
PHYSICAL THERAPY - DAILY TREATMENT NOTE    Patient Name: Leisa Black        Date: 2023  : 1940   YES Patient  Verified  Visit #:   10   of   18  Insurance: Payor: Sahil Posada / Plan: [place] MEDICARE PART A & B / Product Type: Medicare /      In time: 9:55 Out time: 10:50   Total Treatment Time: 55     Medicare/BCBS Time Tracking (below)   Total Timed Codes (min):  55 1:1 Treatment Time:  40     TREATMENT AREA = Right shoulder pain [M25.511]    SUBJECTIVE    Pain Level (on 0 to 10 scale):  2   10   Medication Changes/New allergies or changes in medical history, any new surgeries or procedures? NO    If yes, update Summary List   Subjective Functional Status/Changes:  []  No changes reported     \"It's doing ok. I saw the doctor yesterday about my foot. He said I didn't need surgery and told me to just do rehab for it. So I'll be coming here for that soon. \"          OBJECTIVE     Therapeutic Procedures:  Min Procedure Specifics + Rationale   n/a [x]  Patient Education (performed throughout session) [x] Review HEP    [] Progressed/Changed HEP based on:   [] proper performance and advancement of Therex/TA   [] reduction in pain level    [] increased functional capacity       [] change in directional preference   30 [x] Therapeutic Exercise   [x]  See Flowsheet   Rationale: increase ROM and increase strength to improve the patients ability to participate in ADL's    10 [x] Therapeutic Activity   [x]  See Flowsheet  Re-assessment  Rationale: To improve safety, proprioception, coordination, and efficiency with tasks       Modality rationale: decrease inflammation, decrease pain, increase tissue extensibility and increase muscle contraction/control to improve the patients ability to perform ADL's with greater ease     Min Type Additional Details   15 [x] E-Stim Type:Symmetrical Biphasic  Attended? NO Location: right shoulder  [x] supine              [] prone  [x] legs elevated   [] legs flat  [] sitting   [] sidelying - [] left [] right  [] with heat  [x] with ice  [] Vasopneumatic Device    [x] Skin assessment post-treatment:  [x]intact [x]redness- no adverse reaction       []redness - adverse reaction:       Other Objective/Functional Measures:    See PN     Post Treatment Pain Level (on 0 to 10) scale:   0  / 10     ASSESSMENT    Assessment/Changes in Function:       See PN         Patient will continue to benefit from skilled PT services to modify and progress therapeutic interventions, address functional mobility deficits, address ROM deficits, address strength deficits, analyze and address soft tissue restrictions, analyze and cue movement patterns, analyze and modify body mechanics/ergonomics, and instruct in home and community integration  to attain remaining goals   Progress toward goals / Updated goals:    See PN     PLAN    [x]  Upgrade activities as tolerated  [x]  Update interventions per flow sheet YES Continue plan of care   []  Discharge due to :    []  Other:      Therapist: Felisa Shahid \"BJ\" Margery Romberg, DPT, Cert. MDT, Cert. DN, Cert. SMT, Dip.  Osteopractic    Date: 1/13/2023 Time: 10:11 AM     Future Appointments   Date Time Provider Christina Hawk   1/16/2023 11:30 AM Germaine Duron, PT [facility]   1/18/2023  9:30 AM Germaine Duron, PT [facility]   1/20/2023 10:00 AM Germaine Duron, PT [facility]   1/23/2023  9:30 AM Germaine Duron, PT [facility]   1/25/2023 11:00 AM Germaine Yazidism, PT [facility]   1/27/2023 10:00 AM Germaine Yazidism, PT [facility]

## 2023-01-16 ENCOUNTER — HOSPITAL ENCOUNTER (OUTPATIENT)
Dept: PHYSICAL THERAPY | Age: 83
Discharge: HOME | End: 2023-01-16
Payer: MEDICARE

## 2023-01-16 PROCEDURE — 97140 MANUAL THERAPY 1/> REGIONS: CPT

## 2023-01-16 PROCEDURE — 97110 THERAPEUTIC EXERCISES: CPT

## 2023-01-16 PROCEDURE — 97014 ELECTRIC STIMULATION THERAPY: CPT

## 2023-01-16 PROCEDURE — 97530 THERAPEUTIC ACTIVITIES: CPT

## 2023-01-16 NOTE — PROGRESS NOTES
PHYSICAL THERAPY - DAILY TREATMENT NOTE    Patient Name: Cheko Washburn        Date: 2023  : 1940   YES Patient  Verified  Visit #:   +  Insurance: Payor: Ladi Lemons / Plan: [place] MEDICARE PART A & B / Product Type: Medicare /      In time:  Out time:    Total Treatment Time: 60     Medicare/BCBS Time Tracking (below)   Total Timed Codes (min):  60 1:1 Treatment Time:  45     TREATMENT AREA = Right shoulder pain [M25.511]    SUBJECTIVE    Pain Level (on 0 to 10 scale):  0  / 10   Medication Changes/New allergies or changes in medical history, any new surgeries or procedures? NO    If yes, update Summary List   Subjective Functional Status/Changes:  []  No changes reported     \"My daughter has some questions\"          OBJECTIVE     Therapeutic Procedures:  Min Procedure Specifics + Rationale   n/a [x]  Patient Education (performed throughout session) [x] Review HEP    [] Progressed/Changed HEP based on:   [] proper performance and advancement of Therex/TA   [] reduction in pain level    [] increased functional capacity       [] change in directional preference   15 [x] Therapeutic Exercise   [x]  See Flowsheet   Rationale: increase ROM and increase strength to improve the patients ability to participate in ADL's    15 [x]  Manual Therapy       [] IASTM -   [x] STM to periscapular mm; [address] G3 GH Distraction; G3-4 inferior/posterior mobs;  PROM all planes; Scapular PNF; Shoulder PNF Patterns; Rhythmic Stabilization. Rationale: decrease pain, increase ROM, increase tissue extensibility, decrease trigger points and increase postural awareness to attain functional use and participation with ADL's  [x] Skin assessment post-treatment:  [x]intact [x]redness- no adverse reaction   []redness - adverse  The manual therapy interventions were performed at a separate and distinct time from the therapeutic activities interventions.      15 [x] Therapeutic Activity   [x]  See Flowsheet    Rationale: To improve safety, proprioception, coordination, and efficiency with tasks       Modality rationale: decrease inflammation, decrease pain, increase tissue extensibility and increase muscle contraction/control to improve the patients ability to perform ADL's with greater ease     Min Type Additional Details   15 [x] E-Stim Type:Symmetrical Biphasic  Attended? NO Location: Right shoulder  [x] supine              [] prone  [] legs elevated   [] legs flat  [] sitting   [] sidelying - [] left [] right  [] with heat  [] with ice  [] Vasopneumatic Device    [x] Skin assessment post-treatment:  [x]intact [x]redness- no adverse reaction       []redness - adverse reaction:       Other Objective/Functional Measures:    Discussed with patient's daughter patient's progress and possibly wrapping up PT next week and transitioning to rehab for the patient's feet and balance. Daughter reports no complaints re: his shoulder while at home     Post Treatment Pain Level (on 0 to 10) scale:   0  / 10     ASSESSMENT    Assessment/Changes in Function:     Continued improvement in shoulder girdle strength/stability         Patient will continue to benefit from skilled PT services to modify and progress therapeutic interventions, address functional mobility deficits, address ROM deficits, address strength deficits, analyze and address soft tissue restrictions, analyze and cue movement patterns, analyze and modify body mechanics/ergonomics, and instruct in home and community integration  to attain remaining goals   Progress toward goals / Updated goals:    1st session since progress note. No significant progress observed       PLAN    [x]  Upgrade activities as tolerated  [x]  Update interventions per flow sheet YES Continue plan of care   []  Discharge due to :    []  Other:      Therapist: Jelena Hi \"BJ\" LALO Hunter, Cert. MDT, Cert. DN, Cert. SMT, Dip.  Osteopractic    Date: 1/16/2023 Time: 11:23 AM     Future Appointments Date Time Provider Christina Carine   1/16/2023 11:30 AM Greyson Pulido, PT [facility]   1/18/2023  9:30 AM Greyson Pulido, PT [facility]   1/20/2023 10:00 AM Greyson Pulido, PT [facility]   1/23/2023  9:30 AM Greyson Pulido, PT [facility]   1/25/2023 11:00 AM Greyson Pulido, PT [facility]   1/27/2023 10:00 AM Greyson Pulido, PT [facility]

## 2023-01-18 ENCOUNTER — HOSPITAL ENCOUNTER (OUTPATIENT)
Dept: PHYSICAL THERAPY | Age: 83
Discharge: HOME | End: 2023-01-18
Payer: MEDICARE

## 2023-01-18 PROCEDURE — 97530 THERAPEUTIC ACTIVITIES: CPT

## 2023-01-18 PROCEDURE — 97140 MANUAL THERAPY 1/> REGIONS: CPT

## 2023-01-18 PROCEDURE — 97110 THERAPEUTIC EXERCISES: CPT

## 2023-01-18 NOTE — PROGRESS NOTES
PHYSICAL THERAPY - DAILY TREATMENT NOTE    Patient Name: Fabby Michel        Date: 2023  : 1940   YES Patient  Verified  Visit #:     Insurance: Payor: Jong Flynn / Plan: [place] MEDICARE PART A & B / Product Type: Medicare /      In time: 9:24 Out time: 10:14   Total Treatment Time: 50     Medicare/BCBS Time Tracking (below)   Total Timed Codes (min):  50 1:1 Treatment Time:  40     TREATMENT AREA = Right shoulder pain [M25.511]    SUBJECTIVE    Pain Level (on 0 to 10 scale):  0-1  / 10   Medication Changes/New allergies or changes in medical history, any new surgeries or procedures? NO    If yes, update Summary List   Subjective Functional Status/Changes:  []  No changes reported     \"I'm a lot better than last week. \"          OBJECTIVE     Therapeutic Procedures:  Min Procedure Specifics + Rationale   n/a [x]  Patient Education (performed throughout session) [x] Review HEP    [] Progressed/Changed HEP based on:   [] proper performance and advancement of Therex/TA   [] reduction in pain level    [] increased functional capacity       [] change in directional preference   15 [x] Therapeutic Exercise   [x]  See Flowsheet   Rationale: increase ROM and increase strength to improve the patients ability to participate in ADL's    15 [x]  Manual Therapy       [] IASTM -   [x] STM to periscapular mm; [address] G3 GH Distraction; G3-4 inferior/posterior mobs;  PROM all planes; Scapular PNF; Shoulder PNF Patterns; Rhythmic Stabilization. Rationale: decrease pain, increase ROM, increase tissue extensibility, decrease trigger points and increase postural awareness to attain functional use and participation with ADL's  [x] Skin assessment post-treatment:  [x]intact [x]redness- no adverse reaction   []redness - adverse  The manual therapy interventions were performed at a separate and distinct time from the therapeutic activities interventions.      10 [x] Therapeutic Activity   [x]  See Flowsheet    Rationale: To improve safety, proprioception, coordination, and efficiency with tasks       Modality rationale: decrease inflammation, decrease pain, increase tissue extensibility and increase muscle contraction/control to improve the patients ability to perform ADL's with greater ease     Min Type Additional Details   10 [x]  Cold Pack   [] pre-PHILIPPE      [x] post-PHILIPPE  []  Ice massage Location:right shoulder    [x] supine             [] prone  [x] legs elevated  [] legs flat  [] sitting              [] sidelying - [] left [] right   [x] Skin assessment post-treatment:  [x]intact [x]redness- no adverse reaction       []redness - adverse reaction:       Other Objective/Functional Measures:    Increased reps/sets/resistance per flow sheet. Post Treatment Pain Level (on 0 to 10) scale:   1  / 10     ASSESSMENT    Assessment/Changes in Function:       Notable improvement in flexion ROM         Patient will continue to benefit from skilled PT services to modify and progress therapeutic interventions, address functional mobility deficits, address ROM deficits, address strength deficits, analyze and address soft tissue restrictions, analyze and cue movement patterns, analyze and modify body mechanics/ergonomics, and instruct in home and community integration  to attain remaining goals   Progress toward goals / Updated goals:    Improved carryover in relief between treatment sessions     PLAN    [x]  Upgrade activities as tolerated  [x]  Update interventions per flow sheet YES Continue plan of care   []  Discharge due to :    []  Other:      Therapist: Adeel Miranda \"BJ\" Stephane Price, LALO, Cert. MDT, Cert. DN, Cert. SMT, Dip.  Osteopractic    Date: 1/18/2023 Time: 9:31 AM     Future Appointments   Date Time Provider Christina Hawk   1/20/2023 10:00 AM Charis Tang PT [facility]   1/23/2023  9:30 AM Charis Tang PT [facility]   1/25/2023 11:00 AM Charis Tang PT [facility]   1/27/2023 10:00 AM ZOFIA Hernandez [facility]

## 2023-01-20 ENCOUNTER — HOSPITAL ENCOUNTER (OUTPATIENT)
Dept: PHYSICAL THERAPY | Age: 83
Discharge: HOME | End: 2023-01-20
Payer: MEDICARE

## 2023-01-20 PROCEDURE — 97140 MANUAL THERAPY 1/> REGIONS: CPT

## 2023-01-20 PROCEDURE — 97530 THERAPEUTIC ACTIVITIES: CPT

## 2023-01-20 PROCEDURE — 97110 THERAPEUTIC EXERCISES: CPT

## 2023-01-20 NOTE — PROGRESS NOTES
PHYSICAL THERAPY - DAILY TREATMENT NOTE    Patient Name: Vik Robison        Date: 2023  : 1940   YES Patient  Verified  Visit #:   15   of   20  Insurance: Payor: Smitha Mares / Plan: [place] MEDICARE PART A & B / Product Type: Medicare /      In time: 9:55 Out time: 10:45   Total Treatment Time: 50     Medicare/BCBS Time Tracking (below)   Total Timed Codes (min):  50 1:1 Treatment Time:  38     TREATMENT AREA = Right shoulder pain [M25.511]    SUBJECTIVE    Pain Level (on 0 to 10 scale):  1  / 10   Medication Changes/New allergies or changes in medical history, any new surgeries or procedures? NO    If yes, update Summary List   Subjective Functional Status/Changes:  []  No changes reported     \"I'm feeling good. I can reach better. \"          OBJECTIVE     Therapeutic Procedures:  Min Procedure Specifics + Rationale   n/a [x]  Patient Education (performed throughout session) [x] Review HEP    [] Progressed/Changed HEP based on:   [] proper performance and advancement of Therex/TA   [] reduction in pain level    [] increased functional capacity       [] change in directional preference   15 [x] Therapeutic Exercise   [x]  See Flowsheet   Rationale: increase ROM and increase strength to improve the patients ability to participate in ADL's    10 [x]  Manual Therapy       [] IASTM -   [x] STM to periscapular mm; [address] G3 GH Distraction; G3-4 inferior/posterior mobs;  PROM all planes; Scapular PNF; Shoulder PNF Patterns; Rhythmic Stabilization. Rationale: decrease pain, increase ROM, increase tissue extensibility, decrease trigger points and increase postural awareness to attain functional use and participation with ADL's  [x] Skin assessment post-treatment:  [x]intact [x]redness- no adverse reaction   []redness - adverse  The manual therapy interventions were performed at a separate and distinct time from the therapeutic activities interventions.      15 [x] Therapeutic Activity   [x]  See Flowsheet    Rationale: To improve safety, proprioception, coordination, and efficiency with tasks       Modality rationale: decrease inflammation, decrease pain, increase tissue extensibility and increase muscle contraction/control to improve the patients ability to perform ADL's with greater ease     Min Type Additional Details   10 [x]  Cold Pack   [] pre-PHILIPPE      [x] post-PHILIPPE  []  Ice massage Location:right shoulder    [] supine             [] prone  [] legs elevated  [] legs flat  [] sitting              [] sidelying - [] left [] right   [x] Skin assessment post-treatment:  [x]intact [x]redness- no adverse reaction       []redness - adverse reaction:       Other Objective/Functional Measures:    Increased reps/sets/resistance per flow sheet. Post Treatment Pain Level (on 0 to 10) scale:   0  / 10     ASSESSMENT    Assessment/Changes in Function:       Improvement in functional reach         Patient will continue to benefit from skilled PT services to modify and progress therapeutic interventions, address functional mobility deficits, address ROM deficits, address strength deficits, analyze and address soft tissue restrictions, analyze and cue movement patterns, analyze and modify body mechanics/ergonomics, and instruct in home and community integration  to attain remaining goals   Progress toward goals / Updated goals:    Progressing to DC in 3 sessions or less     PLAN    [x]  Upgrade activities as tolerated  [x]  Update interventions per flow sheet YES Continue plan of care   []  Discharge due to :    []  Other:      Therapist: Jl Nichols \"BJ\" Berlin Santos, DPT, Cert. MDT, Cert. DN, Cert. SMT, Dip.  Osteopractic    Date: 1/20/2023 Time: 10:10 AM     Future Appointments   Date Time Provider Christina Hawk   1/23/2023  9:30 AM Diony Mccann, PT [facility]   1/25/2023 11:00 AM Diony Mccann PT [facility]   1/27/2023 10:00 AM Diony Mccann, PT [facility]

## 2023-01-23 ENCOUNTER — HOSPITAL ENCOUNTER (OUTPATIENT)
Dept: PHYSICAL THERAPY | Age: 83
Discharge: HOME | End: 2023-01-23
Payer: MEDICARE

## 2023-01-23 PROCEDURE — 97140 MANUAL THERAPY 1/> REGIONS: CPT

## 2023-01-23 PROCEDURE — 97112 NEUROMUSCULAR REEDUCATION: CPT

## 2023-01-23 PROCEDURE — 97110 THERAPEUTIC EXERCISES: CPT

## 2023-01-23 NOTE — PROGRESS NOTES
PHYSICAL THERAPY - DAILY TREATMENT NOTE    Patient Name: Miracle Mcpherson        Date: 2023  : 1940   YES Patient  Verified  Visit #:   14   of   20  Insurance: Payor: Moriah Gambino / Plan: [place] MEDICARE PART A & B / Product Type: Medicare /      In time: 9:25 Out time: 10:15   Total Treatment Time: 50     Medicare/BCBS Time Tracking (below)   Total Timed Codes (min):  50 1:1 Treatment Time:  40     TREATMENT AREA = Right shoulder pain [M25.511]    SUBJECTIVE    Pain Level (on 0 to 10 scale):  1  / 10   Medication Changes/New allergies or changes in medical history, any new surgeries or procedures? NO    If yes, update Summary List   Subjective Functional Status/Changes:  []  No changes reported     \"I still get a little bit of the pain\"          OBJECTIVE     Therapeutic Procedures:  Min Procedure Specifics + Rationale   n/a [x]  Patient Education (performed throughout session) [x] Review HEP    [] Progressed/Changed HEP based on:   [] proper performance and advancement of Therex/TA   [] reduction in pain level    [] increased functional capacity       [] change in directional preference   22 [x] Therapeutic Exercise   [x]  See Flowsheet   Rationale: increase ROM and increase strength to improve the patients ability to participate in ADL's    10 [x]  Manual Therapy       [] IASTM -   [x] STM to periscapular mm; [address] G3 GH Distraction; G3-4 inferior/posterior mobs;  PROM all planes; Scapular PNF; Shoulder PNF Patterns; Rhythmic Stabilization. Rationale: decrease pain, increase ROM, increase tissue extensibility, decrease trigger points and increase postural awareness to attain functional use and participation with ADL's  [x] Skin assessment post-treatment:  [x]intact [x]redness- no adverse reaction   []redness - adverse  The manual therapy interventions were performed at a separate and distinct time from the therapeutic activities interventions.      8 [x] Neuromuscular Re-ed   [x]  See Flowsheet    Rationale: reeducation of movement, balance, coordination, kinesthetic sense, posture, and/or proprioception to improve the patients ability to safely participate in ADL's       Modality rationale: decrease inflammation, decrease pain, increase tissue extensibility and increase muscle contraction/control to improve the patients ability to perform ADL's with greater ease     Min Type Additional Details   10 [x]  Cold Pack   [] pre-PHILIPPE      [x] post-PHILIPPE  []  Ice massage Location:right shoulder    [x] supine             [] prone  [x] legs elevated  [] legs flat  [] sitting              [] sidelying - [] left [] right   [x] Skin assessment post-treatment:  [x]intact [x]redness- no adverse reaction       []redness - adverse reaction:       Other Objective/Functional Measures: Added and advanced therex per flow sheet. Post Treatment Pain Level (on 0 to 10) scale:   0  / 10     ASSESSMENT    Assessment/Changes in Function:       Responded well to repeated shoulder movements         Patient will continue to benefit from skilled PT services to modify and progress therapeutic interventions, address functional mobility deficits, address ROM deficits, address strength deficits, analyze and address soft tissue restrictions, analyze and cue movement patterns, analyze and modify body mechanics/ergonomics, and instruct in home and community integration  to attain remaining goals   Progress toward goals / Updated goals:    Progressing to DC in 2 sessions     PLAN    [x]  Upgrade activities as tolerated  [x]  Update interventions per flow sheet YES Continue plan of care   []  Discharge due to :    []  Other:      Therapist: Jona Rodriguez \"BJ\" Glendy Gray DPT, Cert. MDT, Cert. DN, Cert. SMT, Dip.  Osteopractic    Date: 1/23/2023 Time: 9:44 AM     Future Appointments   Date Time Provider Christina Hawk   1/25/2023 11:00 AM Devyn Thapa PT [facility]   1/27/2023 10:00 AM Devyn Thapa PT [facility]

## 2023-01-25 ENCOUNTER — HOSPITAL ENCOUNTER (OUTPATIENT)
Dept: PHYSICAL THERAPY | Age: 83
Discharge: HOME | End: 2023-01-25
Payer: MEDICARE

## 2023-01-25 PROCEDURE — 97140 MANUAL THERAPY 1/> REGIONS: CPT

## 2023-01-25 PROCEDURE — 97530 THERAPEUTIC ACTIVITIES: CPT

## 2023-01-25 PROCEDURE — 97110 THERAPEUTIC EXERCISES: CPT

## 2023-01-25 NOTE — PROGRESS NOTES
PHYSICAL THERAPY - DAILY TREATMENT NOTE    Patient Name: Pop Sweeney        Date: 2023  : 1940   YES Patient  Verified  Visit #:   15   of   20  Insurance: Payor: Marcin Fairbanks / Plan: [place] MEDICARE PART A & B / Product Type: Medicare /      In time: 10:54 Out time: 11:44   Total Treatment Time: 50     Medicare/BCBS Time Tracking (below)   Total Timed Codes (min):  50 1:1 Treatment Time:  40     TREATMENT AREA = Right shoulder pain [M25.511]    SUBJECTIVE    Pain Level (on 0 to 10 scale):  1  / 10   Medication Changes/New allergies or changes in medical history, any new surgeries or procedures? NO    If yes, update Summary List   Subjective Functional Status/Changes:  []  No changes reported     \"Some of the movements are easier\"          OBJECTIVE     Therapeutic Procedures:  Min Procedure Specifics + Rationale   n/a [x]  Patient Education (performed throughout session) [x] Review HEP    [] Progressed/Changed HEP based on:   [] proper performance and advancement of Therex/TA   [] reduction in pain level    [] increased functional capacity       [] change in directional preference   15 [x] Therapeutic Exercise   [x]  See Flowsheet   Rationale: increase ROM and increase strength to improve the patients ability to participate in ADL's    10 [x]  Manual Therapy       [] IASTM -   [x] STM to periscapular mm; [address] G3 GH Distraction; G3-4 inferior/posterior mobs;  PROM all planes; Scapular PNF; Shoulder PNF Patterns; Rhythmic Stabilization. Rationale: decrease pain, increase ROM, increase tissue extensibility, decrease trigger points and increase postural awareness to attain functional use and participation with ADL's  [x] Skin assessment post-treatment:  [x]intact [x]redness- no adverse reaction   []redness - adverse  The manual therapy interventions were performed at a separate and distinct time from the therapeutic activities interventions.      15 [x] Therapeutic Activity   [x]  See Flowsheet    Rationale: To improve safety, proprioception, coordination, and efficiency with tasks       Modality rationale: decrease inflammation, decrease pain, increase tissue extensibility and increase muscle contraction/control to improve the patients ability to perform ADL's with greater ease     Min Type Additional Details   10 [x]  Cold Pack   [] pre-PHILIPPE      [x] post-PHILIPPE  []  Ice massage Location:right shoulder    [x] supine             [] prone  [x] legs elevated  [] legs flat  [] sitting              [] sidelying - [] left [] right   [x] Skin assessment post-treatment:  [x]intact [x]redness- no adverse reaction       []redness - adverse reaction:       Other Objective/Functional Measures:    Increased reps/sets/resistance per flow sheet. Post Treatment Pain Level (on 0 to 10) scale:   0  / 10     ASSESSMENT    Assessment/Changes in Function:       Weakness with standing B ER, but no notable pain         Patient will continue to benefit from skilled PT services to modify and progress therapeutic interventions, address functional mobility deficits, address ROM deficits, address strength deficits, analyze and address soft tissue restrictions, analyze and cue movement patterns, analyze and modify body mechanics/ergonomics, and instruct in home and community integration  to attain remaining goals   Progress toward goals / Updated goals: Will re-assess NV for likely DC     PLAN    [x]  Upgrade activities as tolerated  [x]  Update interventions per flow sheet YES Continue plan of care   []  Discharge due to :    []  Other:      Therapist: Chad Price \"BJ\" LALO Hunter, Cert. MDT, Cert. DN, Cert. SMT, Dip.  Osteopractic    Date: 1/25/2023 Time: 10:58 AM     Future Appointments   Date Time Provider Christina Hawk   1/25/2023 11:00 AM Jaan Arevalo PT [facility]   1/27/2023 10:00 AM Jana Arevalo PT [facility]

## 2023-01-27 ENCOUNTER — HOSPITAL ENCOUNTER (OUTPATIENT)
Dept: PHYSICAL THERAPY | Age: 83
Discharge: HOME | End: 2023-01-27
Payer: MEDICARE

## 2023-01-27 PROCEDURE — 97110 THERAPEUTIC EXERCISES: CPT

## 2023-01-27 PROCEDURE — 97530 THERAPEUTIC ACTIVITIES: CPT

## 2023-01-27 PROCEDURE — 97535 SELF CARE MNGMENT TRAINING: CPT

## 2023-01-27 NOTE — PROGRESS NOTES
[facility] PHYSICAL THERAPY  [address] [place] [address] [place], [address] - Phone: [phone]  Fax: [phone] SUMMARY  Patient Name: Elliot Quezada : 1940   Treatment/Medical Diagnosis: Right shoulder pain [M25.511]   Referral Source: Jd Ferguson*     Date of Initial Visit: 22 Attended Visits: 16 Missed Visits: 0     SUMMARY OF TREATMENT  Patient's POC has consisted of therex, therapeutic activities, manual therapy prn, modalities prn, pt. education, and a comprehensive HEP. Treatment strategies used to address functional mobility deficits, ROM deficits, strength deficits, analyze and address soft tissue restrictions, analyze and cue movement patterns, analyze and modify body mechanics/ergonomics, assess and modify postural abnormalities and instruct in home and community integration. CURRENT STATUS  Patient continued to make excellent progress, denying any resting pain. He is independent in performing his ADL's and his HEP. Right shoulder AROM has improved as follows:   Right Shoulder AROM 22   Flexion 70 142   Extension 42 55   Scaption/ABD 53 120   ER @ 0 Degrees 38 50     GOALS/MEASURE OF PROGRESS Goal Met? 1. Pt will be independent with HEP at D/C for self management. 2. Pt will increased right shd ER MMT to >/= 4/5 for increased ease dressing. 3. Pt will increase FS FOTO Score to >/= 60 to indicate a significant decrease in functional disability. MET    MET    MET     RECOMMENDATIONS  Discontinue therapy. Progressing towards or have reached established goals. If you have any questions/comments please contact us directly at [phone]. Thank you for allowing us to assist in the care of your patient. Therapist Signature: Tyler Pulido \"CORRINA\" William Alvarez, LALO, Cert. MDT, Cert. DN, Cert. SMT, Dip.  Osteopractic Date: 23   Reporting Period: -3/07/70     Certification Period 22-3/19/23 Time: 10:14 AM     NOTE TO PHYSICIAN:  PLEASE COMPLETE THE ORDERS BELOW AND FAX TO   [facility] [place] [facility]: [phone]  If you are unable to process this request in 24 hours please contact our office: [phone]    ___ I have read the above report and request that my patient continue as recommended.   ___ I have read the above report and request that my patient continue therapy with the following changes/special instructions:_________________________________________________________   ___ I have read the above report and request that my patient be discharged from therapy.      Physician Signature:        Date:     Time:

## 2023-01-27 NOTE — PROGRESS NOTES
PHYSICAL THERAPY - DAILY TREATMENT NOTE    Patient Name: Ashley Meckel        Date: 2023  : 1940   YES Patient  Verified  Visit #:     Insurance: Payor: VA MEDICARE / Plan: VA MEDICARE PART A & B / Product Type: Medicare /      In time: 10:00 Out time: 10:48   Total Treatment Time: 48     Medicare/BCBS Time Tracking (below)   Total Timed Codes (min):  48 1:1 Treatment Time:  38     TREATMENT AREA = Right shoulder pain [M25.511]    SUBJECTIVE    Pain Level (on 0 to 10 scale):  0  / 10   Medication Changes/New allergies or changes in medical history, any new surgeries or procedures? NO    If yes, update Summary List   Subjective Functional Status/Changes:  []  No changes reported     \"I'll be here Monday for my foot. \"          OBJECTIVE     Therapeutic Procedures:  Min Procedure Specifics + Rationale   n/a [x]  Patient Education (performed throughout session) [x] Review HEP    [] Progressed/Changed HEP based on:   [] proper performance and advancement of Therex/TA   [] reduction in pain level    [] increased functional capacity       [] change in directional preference   15 [x] Therapeutic Exercise   [x]  See Flowsheet   Rationale: increase ROM and increase strength to improve the patients ability to participate in ADL's    15 [x] Therapeutic Activity   [x]  See Flowsheet    Rationale: To improve safety, proprioception, coordination, and efficiency with tasks   8 [x] Self Care/Home Management HEP DC Planning and post rehab  Rationale: Regain skill and capacity to perform ADL's/IADL's/wellness safely and independently.         Modality rationale: decrease inflammation, decrease pain, increase tissue extensibility and increase muscle contraction/control to improve the patients ability to perform ADL's with greater ease     Min Type Additional Details   10 [x]  Cold Pack   [] pre-PHILIPPE      [x] post-PHILIPPE  []  Ice massage Location:right shoulder    [] supine             [] prone  [] legs elevated  [] legs flat  [] sitting              [] sidelying - [] left [] right   [x] Skin assessment post-treatment:  [x]intact [x]redness- no adverse reaction       []redness - adverse reaction:       Other Objective/Functional Measures:    See DC     Post Treatment Pain Level (on 0 to 10) scale:   0  / 10     ASSESSMENT    Assessment/Changes in Function:       See Dc         Progress toward goals / Updated goals:    See DC     PLAN    [x]  Upgrade activities as tolerated  [x]  Update interventions per flow sheet NO Continue plan of care   []  Discharge due to :    []  Other:      Therapist: Brad Decker \"BJ\" LALO Hunter, Cert. MDT, Cert. DN, Cert. SMT, Dip. Osteopractic    Date: 1/27/2023 Time: 10:13 AM   No future appointments.

## 2023-02-07 ENCOUNTER — HOSPITAL ENCOUNTER (OUTPATIENT)
Dept: PHYSICAL THERAPY | Age: 83
Discharge: HOME | End: 2023-02-07
Payer: MEDICARE

## 2023-02-07 PROCEDURE — 97110 THERAPEUTIC EXERCISES: CPT

## 2023-02-07 PROCEDURE — 97162 PT EVAL MOD COMPLEX 30 MIN: CPT

## 2023-02-07 PROCEDURE — 97140 MANUAL THERAPY 1/> REGIONS: CPT

## 2023-02-07 PROCEDURE — 97112 NEUROMUSCULAR REEDUCATION: CPT

## 2023-02-07 NOTE — PROGRESS NOTES
[address] [place] [address] PHYSICAL THERAPY  [address] [facility] [place], [address] - Phone: [phone]  Fax: [phone] / [phone] [facility]  Patient Name: Perlita Dave : 1940   Medical   Diagnosis: Pain, joint, foot, right [M25.571]  Primary osteoarthritis, unspecified ankle and foot [M19.079] Treatment Diagnosis: Right Foot OA  Right Plantar Fasciitis  Left Foot Pain    Onset Date: Chronic     Referral Source: Ruby Moore MD Start of Care [facility]): 2023   Prior Hospitalization: See medical history Provider #: [id number]   Prior Level of Function: Functionally independent   Comorbidities: HTN, Hs of cardiac stent, [place], DM   Medications: Verified on Patient Summary List   The Plan of Care and following information is based on the information from the initial evaluation.   ===========================================================================================  Assessment / key information: Patient is a [de-identified] y.o. male presenting with CC B foot pain (Right > Left) and unsteady gait. He denies any root cause, with worsening of symptoms due to deconditioning and pain. He currently demonstrates the following objective measures:                                        AROM                   PROM                Strength (1-5)    Left Right Left Right Left  Right   Dorsiflexsion 0 0 5 7 3 3   Plantarflexsion 60 60 65 67 3 3   Inversion 10 11 13 19 4 4   Eversion 7 8 15 12 4 4   Symptoms hinder ambulation tolerance, stair negotiation, and make him a risk for falls . Pt  will benefit from physical therapist management to address his impairments (listed below),  educate him, and improve his level of function.  Thanks for your referral.   ===========================================================================================  Eval Complexity: History: HIGH Complexity :3+ comorbidities / personal factors will impact the outcome/ POC Exam:HIGH Complexity : 4+ Standardized tests and measures addressing body structure, function, activity limitation and / or participation in recreation  Presentation: LOW Complexity : Stable, uncomplicated  Clinical Decision Making:MEDIUM Complexity : FOTO score of 26-74Overall Complexity:LOW   Problem List: pain affecting function, decrease ROM, decrease strength, edema affecting function, impaired gait/ balance, decrease ADL/ functional abilitiies, decrease activity tolerance, decrease flexibility/ joint mobility, and decrease transfer abilities  FOTO score: 35 indicating 65% functional disability  Treatment Plan may include any combination of the following: Therapeutic exercise, Neuromuscular reeducation, Manual therapy, Therapeutic activity, Self care/home management, Electric stim unattended , Vasopneumatic device, and Gait training  Patient / Family readiness to learn indicated by: asking questions, trying to perform skills, and interest  Persons(s) to be included in education: patient (P)  Barriers to Learning/Limitations: None  Measures taken: N/a   Patient Goal (s): \"Walk better\"   Patient self reported health status: fair  Rehabilitation Potential: good  Short Term Goals: To be accomplished in  2-3  weeks:  1. Patient to be adherent to HEP to facilitate pain control with ADL's.  2. Patient to improve TUG score to < 30\" to indicate improved independence and safety with household mobility. Long Term Goals: To be accomplished in  4-6  weeks:  1. Patient to be Safe and Independent with HEP to self-manage/prevent symptoms after DC. 2. Patient to increase FS FOTO score to > 57 to indicate increased functional independence. 3. Patient to improve FGA score to > 14 to indicate reduced risk for falls. 4. Patient to demonstrate safe stair negotiation in reciprocal pattern with <   1UE assist to facilitate community mobility.    Frequency / Duration:   Patient to be seen  2  times per week for 4-6  weeks:  Patient / Caregiver education and instruction: activity modification and exercises. We reviewed our facility's Patient Personal Responsibilities (PPR) form, particularly in regards to compliance towards his appointment time, our attendance policy, and his home exercise program. Patient was informed of possible discharge for non-compliance to our attendance policy per PPR form. We also discussed his POC as deemed appropriate by the treating therapist and physician. Patient verbalized understanding that he must show objective and functional improvement in an appropriate time frame. Patient verbalized understanding that should progress or compliance be lacking, we will contact the referring physician for further consultation to address and attempt to establish alternate treatment strategies as necessary and/or possibly discharge. Therapist Signature: Adeel Miranda \"BJ\" Stephane Price DPT, Cert. MDT, Cert. DN, Cert. SMT, Dip. Osteopractic Date: 7/0/9205   Certification Period: 2/7/23-5/6/23 Time: 10:27 AM   ===========================================================================================  I certify that the above Physical Therapy Services are being furnished while the patient is under my care. I agree with the treatment plan and certify that this therapy is necessary. Physician Signature:        Date:       Time:                                         Jaime Cook MD   Please sign and return to [employer] or you may fax the signed copy to [phone]. Thank you.

## 2023-02-07 NOTE — PROGRESS NOTES
PHYSICAL THERAPY - DAILY TREATMENT NOTE  Patient Name: Fam Iba        Date: 2023  : 1940   [x]  Patient  Verified  Visit #:   1     Insurance: Payor: VA MEDICARE / Plan: VA MEDICARE PART A & B / Product Type: Medicare /      In time:   9:30          Out time:   10:23 Total Treatment Time (min):   53   Medicare Time Tracking (below)   Total Timed Codes (min):  38 1:1 Treatment Time:  38     TREATMENT AREA = Right foot drop [M21.371]  Left foot drop [M21.372]    SUBJECTIVE    Pain Level (on 0 to 10 scale):  10  / 10   Medication Changes/New allergies or changes in medical history, any new surgeries or procedures? []  No    []  Yes   If yes, update Summary List:    Subjective Functional Status/Changes:  []  No changes reported     HISTORY    Present Symptoms:B foot pain, right > left. Present since: 2-3 years  [] Improving []  Unchanging []  Worsening          Commenced as a result of:   or [x]  No apparent reason. Progressive pain and weakness to B feet. Noticed he wasn't walking straight anymore. Reports pain in B knees. Reports tingling to bottom of right foot 2 weeks ago, and then last week, but hasn't had any further recurrence. What produces or worsens: prolonged standing. Walking regardless of distance    What stops or reduces: unloading. Continued use makes the pain:  [] Better [x]  Worse []  No effect     Disturbed night: [x] No    [] Yes    Pain at rest: [x] No    [] Yes       Treatments this episode: none  Other questions: reports recent imaging (+) for OA to foot. Does not require sx.      Spinal history:    Paraesthesia: [] No    [] Yes     General Health:  [] Good []  Fair []  Poor     Imaging:   [] Yes []  No       Work:  Mechanical Stresses:retired     Leisure: Mechanical Stresses:daily walking    Functional disability from present episode:pain with standing/walking, difficulty with stairs/steps, fall risk    Summary:    [] Acute []  Sub-acute [x]  Chronic     [] Trauma/Surgery []  Insidious onset        OBJECTIVE    Physical Therapy Evaluation  - Foot and Ankle    Gait: [] Normal    [x] Abnormal    [] Antalgic    [] NWB    Device:    ROM/Strength  [] Unable to assess at this time      AROM        PROM            Strength (1-5)   Left Right Left Right Left  Right   Dorsiflexsion 0 0 5 7 3 3   Plantarflexsion 60 60 65 67 3 3   Inversion 10 11 13 19 4 4   Eversion 7 8 15 12 4 4   Great Toe Ext         Great Toe Flex           Flexibility: [] Unable to assess at this time  Gastroc:    (L) Tightness [] WNL   [] Min   [] Mod   [x] Severe    (R) Tightness [] WNL   [] Min   [] Mod   [x] Severe  Soleus:    (L) Tightness [] WNL   [] Min   [] Mod   [x] Severe    (R) Tightness [] WNL   [] Min   [] Mod   [x] Severe  Other:      (L) Tightness [] WNL   [] Min   [] Mod   [] Severe    (R) Tightness [] WNL   [] Min   [] Mod   [] Severe    Palpation:   Location: right calcaneus  Patient's Pain Response: [] Min   [] Mod   [x] Severe  Location: right plantar fascia  Patient's Pain Response: [] Min   [] Mod   [x] Severe  Forefoot alignment:  [] Neutral     [] Varus            [] Valgus      Therapeutic Procedures:  Min Procedure Specifics + Rationale   n/a [x]  Patient Education (performed throughout session) [x] Review HEP    [] Progressed/Changed HEP based on:   [] proper performance and advancement of Therex/TA   [] reduction in pain level    [] increased functional capacity       [] change in directional preference   10 [x] Therapeutic Exercise   [x]  See Flowsheet   Rationale: increase ROM and increase strength to improve the patients ability to participate in ADL's    13 [x]  Manual Therapy [] STM/DTM     [] IASTM     [] Scar Massage  [] Cross friction       [] PNF         [] PROM    [] TDN (see objective; actual needle insertion time not billed)   [] Soft tissue mobilization   [] Joint mobilization: Gr I [] II []  III [] IV[] V[]:  G1-G4 talocrural distraction/posterior/anterior glides, G1-G3 calcaneal medial/lateral glides, G1-G4 tarsal mobs. Repeated DF mobilization    Rationale: decrease pain, increase ROM, increase tissue extensibility, decrease trigger points and increase postural awareness to attain functional use and participation with ADL's   8 [x] Neuromuscular Re-ed   [x]  See Flowsheet;    Rationale: increase ROM, increase strength, improve coordination, improve balance and increase proprioception  to improve the patients ability to safely participate in ADL's       Post Treatment Pain Level (on 0 to 10) scale:   9  / 10     ASSESSMENT    Justification for Eval Code Complexity:  Patient History (low 0, mod 1-2, high 3-4): high  Examination (low 1-2, mod 3+, high 4+): high  Clinical Presentation (low stable or uncomplicated, mod evolving or changing, high unstable or unpredictable): mod  Clinical Decision Making (low , mod 26-74, high 1-25): FOTO mod       []  See Progress Note/Recertification   Patient will continue to benefit from skilled therapy to address remaining functional deficits:  See PoC   Progress toward goals / Updated goals:    See PoC     PLAN    [x]  Upgrade activities as tolerated []  Continue plan of care   []  Discharge due to :    [x]  Other: Initiate POC     Therapist: Krystal Bentley \"CORRINA\" Van Stevens DPT, Cert MDT, Cert. SHIRAT, Dip.  Osteopractic Date: 2/7/2023     Time: 9:16 AM

## 2023-02-10 ENCOUNTER — HOSPITAL ENCOUNTER (OUTPATIENT)
Dept: PHYSICAL THERAPY | Age: 83
Discharge: HOME | End: 2023-02-10
Payer: MEDICARE

## 2023-02-10 PROCEDURE — 97112 NEUROMUSCULAR REEDUCATION: CPT

## 2023-02-10 PROCEDURE — 97110 THERAPEUTIC EXERCISES: CPT

## 2023-02-10 NOTE — PROGRESS NOTES
PHYSICAL THERAPY - DAILY TREATMENT NOTE    Patient Name: Bianca Harden        Date: 2/10/2023  : 1940   YES Patient  Verified  Visit #:   2   of     Insurance: Payor: VA MEDICARE / Plan: VA MEDICARE PART A & B / Product Type: Medicare /      In time: 12:30 Out time: 1:00   Total Treatment Time: 30     Medicare/BCBS [place] Time Tracking (below)   Total Timed Codes (min):  30 1:1 Treatment Time:  30     TREATMENT AREA =  Pain, joint, foot, right [M25.571]  Primary osteoarthritis, unspecified ankle and foot [M19.079]    SUBJECTIVE    Pain Level (on 0 to 10 scale):  10  / 10   Medication Changes/New allergies or changes in medical history, any new surgeries or procedures? NO    If yes, update Summary List   Subjective Functional Status/Changes:  []  No changes reported     Pt reports didn't do exercises at home, doesn't remember if he got the paper or not. Pt states people have told him about using a cane, but feels like he will rely on it too much and he prefers just to slow down and take his time, states he isn't falling. OBJECTIVE    20 min Therapeutic Exercise:  [x]  See flow sheet   Rationale:    increase ROM, increase strength, improve coordination, improve balance and increase proprioception to promote increased functional mobility and increased activity tolerance with ADL's. 10 min Neuromuscular Re-ed: Static standing balance exercises with EO/EC  repeated ankle DF    Rationale:     improve coordination, improve balance and increase proprioception to improve the patients ability to perform ADLs, transfers and gait safely and independently. min Patient Education:  YES  Reviewed HEP   []  Progressed/Changed HEP based on:   Provided pt updated HEP handout and reviewed. Other Objective/Functional Measures:    Pt presents to PT ambulating with widened STELLA, decreased gait speed, decreased stride length and lacks TKE and heel strike.      Initiated LE strengthening ex per flow sheet. Attempted HR/TR in standing, pt demo's compensatory trunk lean. Modified to seated for better tolerance and form. Pt c/o  pain dorsal aspect of right foot across joint line with heel raises in standing. Static standing balance:   EO ROM 30\"   EO MSR instep 30\" B   EC ROM 30\"      Post Treatment Pain Level (on 0 to 10) scale:   9  / 10     ASSESSMENT    Assessment/Changes in Function:     Pt with good tolerance to initiation of therapeutic exercise. []  See Progress Note/Recertification   Patient will continue to benefit from skilled PT services to modify and progress therapeutic interventions, address functional mobility deficits, address ROM deficits, address strength deficits, analyze and address soft tissue restrictions, analyze and cue movement patterns, assess and modify postural abnormalities, and instruct in home and community integration to attain remaining goals. Progress toward goals / Updated goals:    1. Patient to be adherent to HEP to facilitate pain control with ADL's. reviewed and issued updated HEP  2. Patient to improve TUG score to < 30\" to indicate improved independence and safety with household mobility. PLAN    []  Upgrade activities as tolerated YES Continue plan of care   []  Discharge due to :    []  Other:      Therapist: Aníbal Mesa PTA    Date: 2/10/2023 Time: 12:38 PM     No future appointments.

## 2023-02-14 ENCOUNTER — HOSPITAL ENCOUNTER (OUTPATIENT)
Facility: HOSPITAL | Age: 83
Setting detail: RECURRING SERIES
Discharge: HOME | End: 2023-02-17
Payer: MEDICARE

## 2023-02-14 PROCEDURE — 97110 THERAPEUTIC EXERCISES: CPT

## 2023-02-14 NOTE — PROGRESS NOTES
PHYSICAL / OCCUPATIONAL THERAPY - DAILY TREATMENT NOTE (updated )    Patient Name: Alvarado Le    Date: 2023    : 1940  Insurance: Payor: MEDICARE / Plan: MEDICARE PART A AND B / Product Type: *No Product type* /      Patient  verified yes     Visit #   Current / Total 3 8-12   Time   In / Out 2:00 2:35   Pain   In / Out 7/10 6/10   Subjective Functional Status/Changes: \"I think my foot pain contributes to my balance. I don't want to use a cane. \"   Changes to:  Meds, Allergies, Med Hx, Sx Hx? If yes, update Summary List no       TREATMENT AREA =  Pain in right ankle and joints of right foot [M25.571]  Primary osteoarthritis, unspecified ankle and foot [M19.079]    OBJECTIVE      Therapeutic Procedures: Tx Min Billable or 1:1 Min (if diff from Tx Min) Procedure, Rationale, Specifics   35  25964 Therapeutic Exercise (timed):  increase ROM, strength, coordination, balance, and proprioception to improve patient's ability to progress to PLOF and address remaining functional goals. (see flow sheet as applicable)     Details if applicable:       28  [facility] Totals Reminder: bill using total billable min of TIMED therapeutic procedures (example: do not include dry needle or estim unattended, both untimed codes, in totals to left)  8-22 min = 1 unit; 23-37 min = 2 units; 38-52 min = 3 units; 53-67 min = 4 units; 68-82 min = 5 units   Total Total     [x]  Patient Education billed concurrently with other procedures   [x] Review HEP    [] Progressed/Changed HEP, detail:    [] Other detail:       Objective Information/Functional Measures/Assessment    EO MSR(bun): 30\" B  EC MSR(instep): patient opens eyes ~ every 3-4 seconds  EC ROM: 30\" (patient with 1 brief episode of opening eyes toward end of time period)    Added ankle 4 way with theraband    Patient ambulates with increased MARYA, decreased stride length, decreased foot clearance, and decreased gait speed.   He reaches for walls and objects but denies walking with an assistive device [facility]). Patient will continue to benefit from skilled PT / OT services to modify and progress therapeutic interventions, analyze and address functional mobility deficits, analyze and address ROM deficits, analyze and address strength deficits, analyze and address soft tissue restrictions, analyze and cue for proper movement patterns, analyze and modify for postural abnormalities, analyze and address imbalance/dizziness, and instruct in home and community integration to address functional deficits and attain remaining goals. Progress toward goals / Updated goals:  []  See Progress Note/Recertification    Short Term Goals: To be accomplished in  2-3  weeks:  1. Patient to be adherent to HEP to facilitate pain control with ADL's.  2. Patient to improve TUG score to < 30\" to indicate improved independence and safety with household mobility. Long Term Goals: To be accomplished in  4-6  weeks:  1. Patient to be Safe and Independent with HEP to self-manage/prevent symptoms after DC. 2. Patient to increase FS FOTO score to > 57 to indicate increased functional independence. 3. Patient to improve FGA score to > 14 to indicate reduced risk for falls. 4. Patient to demonstrate safe stair negotiation in reciprocal pattern with <   1UE assist to facilitate community mobility.      PLAN  yes Continue plan of care  [x]  Upgrade activities as tolerated  []  Discharge due to :  []  Other:    Anyi Johnson, PT    2/14/2023    2:08 PM    Future Appointments   Date Time Provider Lupe Lynne   2/16/2023 12:00 PM Angie Masters, PT [facility]   2/21/2023 11:30 AM Marty Renteria, SUZY [facility]   2/23/2023 12:00 PM Angie Masters PT [facility]   2/28/2023 11:30 AM Anyi Johnson, PT [facility]

## 2023-02-16 ENCOUNTER — HOSPITAL ENCOUNTER (OUTPATIENT)
Facility: HOSPITAL | Age: 83
Setting detail: RECURRING SERIES
Discharge: HOME | End: 2023-02-19
Payer: MEDICARE

## 2023-02-16 PROCEDURE — 97110 THERAPEUTIC EXERCISES: CPT

## 2023-02-16 PROCEDURE — 97140 MANUAL THERAPY 1/> REGIONS: CPT

## 2023-02-16 NOTE — PROGRESS NOTES
PHYSICAL / OCCUPATIONAL THERAPY - DAILY TREATMENT NOTE (updated )    Patient Name: Jing Bond    Date: 2023    : 1940  Insurance: Payor: MEDICARE / Plan: MEDICARE PART A AND B / Product Type: *No Product type* /      Patient  verified yes     Visit #   Current / Total 4 8-12   Time   In / Out 12:00 12:30   Pain   In / Out 2-3 2-3   Subjective Functional Status/Changes: Can I have a band to take home? These exercises really help. Even when I just move my foot around in circles   Changes to:  Meds, Allergies, Med Hx, Sx Hx? If yes, update Summary List no       TREATMENT AREA =  Pain in right ankle and joints of right foot [M25.571]  Primary osteoarthritis, unspecified ankle and foot [M19.079]    OBJECTIVE    Therapeutic Procedures: Tx Min Billable or 1:1 Min (if diff from Tx Min) Procedure, Rationale, Specifics   15 8 20726 Therapeutic Exercise (timed):  increase ROM, strength, coordination, balance, and proprioception to improve patient's ability to progress to PLOF and address remaining functional goals. (see flow sheet as applicable)     Details if applicable:       15 15 06070 Manual Therapy (timed):  decrease pain, increase ROM, and increase tissue extensibility to improve patient's ability to progress to PLOF and address remaining functional goals. The manual therapy interventions were performed at a separate and distinct time from the therapeutic activities interventions .  (see flow sheet as applicable)     Details if applicable:     30 21 [facility] Totals Reminder: bill using total billable min of TIMED therapeutic procedures (example: do not include dry needle or estim unattended, both untimed codes, in totals to left)  8-22 min = 1 unit; 23-37 min = 2 units; 38-52 min = 3 units; 53-67 min = 4 units; 68-82 min = 5 units   Total Total     [x]  Patient Education billed concurrently with other procedures   [x] Review HEP    [] Progressed/Changed HEP, detail:    [] Other detail: Objective Information/Functional Measures/Assessment    Increased reps/sets/resistance per flow sheet. Patient will continue to benefit from skilled PT / OT services to modify and progress therapeutic interventions, analyze and address functional mobility deficits, analyze and address ROM deficits, analyze and address strength deficits, analyze and address soft tissue restrictions, analyze and cue for proper movement patterns, analyze and modify for postural abnormalities, analyze and address imbalance/dizziness, and instruct in home and community integration to address functional deficits and attain remaining goals. Progress toward goals / Updated goals:  []  See Progress Note/Recertification    Tolerated treatment well without complaints of progression, indicating improved functional capacity for ADL's. PLAN  yes Continue plan of care  []  Upgrade activities as tolerated  []  Discharge due to :  []  Other:    Ligia Lema \"BJ\" ESPERANZA Rogers, Cert. MDT, Cert. DN, Cert. SMT, Dip.  Osteopractic    2/16/2023    12:38 PM    Future Appointments   Date Time Provider Lupe Lynne   2/21/2023 11:30 AM Dean Green, PT [facility]   2/23/2023 12:00 PM Kasey Mckeon, PT [facility]   2/28/2023 11:30 AM Lulu Lin, PT [facility]

## 2023-02-21 ENCOUNTER — HOSPITAL ENCOUNTER (OUTPATIENT)
Facility: HOSPITAL | Age: 83
Setting detail: RECURRING SERIES
Discharge: HOME | End: 2023-02-24
Payer: MEDICARE

## 2023-02-21 PROCEDURE — 97110 THERAPEUTIC EXERCISES: CPT

## 2023-02-21 NOTE — PROGRESS NOTES
PHYSICAL / OCCUPATIONAL THERAPY - DAILY TREATMENT NOTE (updated )    Patient Name: Tomas Orellana    Date: 2023    : 1940  Insurance: Payor: MEDICARE / Plan: MEDICARE PART A AND B / Product Type: *No Product type* /      Patient  verified yes     Visit #   Current / Total 5 8-12   Time   In / Out 11:33 12:08   Pain   In / Out 6-7/10 6-7/10   Subjective Functional Status/Changes: \"I don't want to become dependent on a cane. \"   Changes to:  Meds, Allergies, Med Hx, Sx Hx? If yes, update Summary List no       TREATMENT AREA =  Pain in right ankle and joints of right foot [M25.571]  Primary osteoarthritis, unspecified ankle and foot [M19.079]    OBJECTIVE    Therapeutic Procedures: Tx Min Billable or 1:1 Min (if diff from Tx Min) Procedure, Rationale, Specifics   35  69193 Therapeutic Exercise (timed):  increase ROM, strength, coordination, balance, and proprioception to improve patient's ability to progress to PLOF and address remaining functional goals. (see flow sheet as applicable)     Details if applicable:       28  [facility] Totals Reminder: bill using total billable min of TIMED therapeutic procedures (example: do not include dry needle or estim unattended, both untimed codes, in totals to left)  8-22 min = 1 unit; 23-37 min = 2 units; 38-52 min = 3 units; 53-67 min = 4 units; 68-82 min = 5 units   Total Total     [x]  Patient Education billed concurrently with other procedures   [x] Review HEP    [] Progressed/Changed HEP, detail:    [] Other detail:       Objective Information/Functional Measures/Assessment    Cont exercises per flowsheet    Patient requires verbal and tactile cues for correct technique with exercises. He continues to ambulate with wide MARYA and intermittently reaches for objects/furniture for stability when walking. He denies trial of assistive device. Discussed importance of safety when walking.     Patient will continue to benefit from skilled PT / OT services to modify and progress therapeutic interventions, analyze and address functional mobility deficits, analyze and address ROM deficits, analyze and address strength deficits, analyze and address soft tissue restrictions, analyze and cue for proper movement patterns, analyze and modify for postural abnormalities, analyze and address imbalance/dizziness, and instruct in home and community integration to address functional deficits and attain remaining goals. Progress toward goals / Updated goals:  []  See Progress Note/Recertification    Short Term Goals: To be accomplished in  2-3  weeks:  1. Patient to be adherent to HEP to facilitate pain control with ADL's.  2. Patient to improve TUG score to < 30\" to indicate improved independence and safety with household mobility. Long Term Goals: To be accomplished in  4-6  weeks:  1. Patient to be Safe and Independent with HEP to self-manage/prevent symptoms after DC. 2. Patient to increase FS FOTO score to > 57 to indicate increased functional independence. 3. Patient to improve FGA score to > 14 to indicate reduced risk for falls. 4. Patient to demonstrate safe stair negotiation in reciprocal pattern with <   1UE assist to facilitate community mobility.      PLAN  yes Continue plan of care  [x]  Upgrade activities as tolerated  []  Discharge due to :  []  Other:    Yaneli Jorgensen, PT    2/21/2023    11:56 AM    Future Appointments   Date Time Provider Lupe Lynne   2/23/2023 12:00 PM Batool Shepard, PT [facility]   2/28/2023 11:30 AM Yaneli Jorgensen, PT [facility]

## 2023-02-23 ENCOUNTER — HOSPITAL ENCOUNTER (OUTPATIENT)
Facility: HOSPITAL | Age: 83
Setting detail: RECURRING SERIES
Discharge: HOME | End: 2023-02-26
Payer: MEDICARE

## 2023-02-23 PROCEDURE — 97110 THERAPEUTIC EXERCISES: CPT

## 2023-02-23 PROCEDURE — 97140 MANUAL THERAPY 1/> REGIONS: CPT

## 2023-02-23 NOTE — PROGRESS NOTES
PHYSICAL / OCCUPATIONAL THERAPY - DAILY TREATMENT NOTE (updated )    Patient Name: Sona Levine    Date: 2023    : 1940  Insurance: Payor: MEDICARE / Plan: MEDICARE PART A AND B / Product Type: *No Product type* /      Patient  verified yes     Visit #   Current / Total 6 8-12   Time   In / Out 11:45early 12:25   Pain   In / Out 5 3   Subjective Functional Status/Changes: I'm doing ok. I was confused because my daughter called me this morning and told me that my appointment was earlier, but I thought to come early just in case. Changes to:  Meds, Allergies, Med Hx, Sx Hx? If yes, update Summary List no       TREATMENT AREA =  Pain in right ankle and joints of right foot [M25.571]  Primary osteoarthritis, unspecified ankle and foot [M19.079]    OBJECTIVE    Therapeutic Procedures: Tx Min Billable or 1:1 Min (if diff from Tx Min) Procedure, Rationale, Specifics   30 30 [id number] Therapeutic Exercise (timed):  increase ROM, strength, coordination, balance, and proprioception to improve patient's ability to progress to PLOF and address remaining functional goals. (see flow sheet as applicable)     Details if applicable:       10 10 99553 Manual Therapy (timed):  decrease pain, increase ROM, and increase tissue extensibility to improve patient's ability to progress to PLOF and address remaining functional goals. The manual therapy interventions were performed at a separate and distinct time from the therapeutic activities interventions .  (see flow sheet as applicable)     Details if applicable:     36 40 [facility] Totals Reminder: bill using total billable min of TIMED therapeutic procedures (example: do not include dry needle or estim unattended, both untimed codes, in totals to left)  8-22 min = 1 unit; 23-37 min = 2 units; 38-52 min = 3 units; 53-67 min = 4 units; 68-82 min = 5 units   Total Total   [x]  Patient Education billed concurrently with other procedures   [x] Review HEP    [] Progressed/Changed HEP  [] Other:    Objective Information/Functional Measures/Assessment    Increased reps/sets/resistance per flow sheet. Patient will continue to benefit from skilled PT services to modify and progress therapeutic interventions, analyze and address functional mobility deficits, analyze and address ROM deficits, analyze and address strength deficits, analyze and address soft tissue restrictions, analyze and cue for proper movement patterns, analyze and modify for postural abnormalities, and balance  to address functional deficits and attain remaining goals. Progress toward goals / Updated goals:  []  See Progress Note/Recertification    Progressing in standing tolerance    PLAN  yes Continue plan of care  []  Upgrade activities as tolerated  []  Discharge due to :  []  Other:    Summer Solitario \"BJ\" SHASHA RogersT, Cert. MDT, Cert. DN, Cert. SMT, Dip.  Osteopractic    2/23/2023    12:00 PM    Future Appointments   Date Time Provider Lupe Lynne   2/28/2023 11:30 AM Vanessa Nova, PT [facility]

## 2023-02-28 ENCOUNTER — HOSPITAL ENCOUNTER (OUTPATIENT)
Facility: HOSPITAL | Age: 83
Setting detail: RECURRING SERIES
Discharge: HOME | End: 2023-03-03
Payer: MEDICARE

## 2023-02-28 PROCEDURE — 97110 THERAPEUTIC EXERCISES: CPT

## 2023-02-28 PROCEDURE — 97112 NEUROMUSCULAR REEDUCATION: CPT

## 2023-02-28 NOTE — PROGRESS NOTES
PHYSICAL / OCCUPATIONAL THERAPY - DAILY TREATMENT NOTE (updated )    Patient Name: Parveen Orozco    Date: 2023    : 1940  Insurance: Payor: MEDICARE / Plan: MEDICARE PART A AND B / Product Type: *No Product type* /      Patient  verified yes     Visit #   Current / Total 7 8-12   Time   In / Out 11:32 12:12   Pain   In / Out 5/10 5/10   Subjective Functional Status/Changes: \"I have a hard time doing the balance exercises with my eyes closed. \"   Changes to:  Meds, Allergies, Med Hx, Sx Hx? If yes, update Summary List no       TREATMENT AREA =  Pain in right ankle and joints of right foot [M25.571]  Primary osteoarthritis, unspecified ankle and foot [M19.079]    OBJECTIVE    Therapeutic Procedures: Tx Min Billable or 1:1 Min (if diff from Tx Min) Procedure, Rationale, Specifics   10  29670 Therapeutic Exercise (timed):  increase ROM, strength, coordination, balance, and proprioception to improve patient's ability to progress to PLOF and address remaining functional goals. (see flow sheet as applicable)     Details if applicable:       30  74877 Neuromuscular Re-Education (timed):  improve balance, coordination, kinesthetic sense, posture, core stability and proprioception to improve patient's ability to develop conscious control of individual muscles and awareness of position of extremities in order to progress to PLOF and address remaining functional goals.  (see flow sheet as applicable)     Details if applicable:     36  [facility] Totals Reminder: bill using total billable min of TIMED therapeutic procedures (example: do not include dry needle or estim unattended, both untimed codes, in totals to left)  8-22 min = 1 unit; 23-37 min = 2 units; 38-52 min = 3 units; 53-67 min = 4 units; 68-82 min = 5 units   Total Total     [x]  Patient Education billed concurrently with other procedures   [x] Review HEP    [] Progressed/Changed HEP, detail:    [] Other detail:       Objective Information/Functional Measures/Assessment    Patient requires seated rest breaks throughout session. Discussed proc and cons of [place] tennis shoes regarding balance. Performed EO/EC balance exercises with shoes on and off to determine if cushioned soles from shoes were affecting balance. Patient able to perform balance exercises with less sway without shoes on but reported no significant change in symptoms. Consider trial of BIG stepping exercises next visit. Patient will continue to benefit from skilled PT / OT services to modify and progress therapeutic interventions, analyze and address functional mobility deficits, analyze and address ROM deficits, analyze and address strength deficits, analyze and address soft tissue restrictions, analyze and cue for proper movement patterns, analyze and modify for postural abnormalities, analyze and address imbalance/dizziness, and instruct in home and community integration to address functional deficits and attain remaining goals. Progress toward goals / Updated goals:  []  See Progress Note/Recertification    Short Term Goals: To be accomplished in  2-3  weeks:  1. Patient to be adherent to HEP to facilitate pain control with ADL's. Compliant with HEP  2. Patient to improve TUG score to < 30\" to indicate improved independence and safety with household mobility. Long Term Goals: To be accomplished in  4-6  weeks:  1. Patient to be Safe and Independent with HEP to self-manage/prevent symptoms after DC. 2. Patient to increase FS FOTO score to > 57 to indicate increased functional independence. 3. Patient to improve FGA score to > 14 to indicate reduced risk for falls. 4. Patient to demonstrate safe stair negotiation in reciprocal pattern with <   1UE assist to facilitate community mobility.      PLAN  yes Continue plan of care  [x]  Upgrade activities as tolerated  []  Discharge due to :  []  Other:    Arminda Cottrell, PT    2/28/2023    11:40 AM    Future Appointments   Date Time Provider Lupe Lynne   3/2/2023 12:30 PM Mary Del Rosario, PT [facility]   3/7/2023 12:30 PM Mary Del Rosario, PT [facility]   3/9/2023 12:30 PM Mary Del Rosario, PT [facility]   3/14/2023  1:00 PM Mary Del Rosario, PT [facility]   3/16/2023  1:00 PM Rufina Carreon, PT [facility]   3/21/2023 12:30 PM Mary Del Rosario, PT [facility]   3/23/2023  1:00 PM Rufina Carreon, PT [facility]   3/28/2023 12:30 PM Mary Del Rosario, PT [facility]   3/30/2023 12:30 PM [facility]

## 2023-03-02 ENCOUNTER — HOSPITAL ENCOUNTER (OUTPATIENT)
Facility: HOSPITAL | Age: 83
Setting detail: RECURRING SERIES
Discharge: HOME | End: 2023-03-05
Payer: MEDICARE

## 2023-03-02 PROCEDURE — 97112 NEUROMUSCULAR REEDUCATION: CPT

## 2023-03-02 PROCEDURE — 97110 THERAPEUTIC EXERCISES: CPT

## 2023-03-02 NOTE — PROGRESS NOTES
PHYSICAL / OCCUPATIONAL THERAPY - DAILY TREATMENT NOTE (updated )    Patient Name: Alex Hernandez    Date: 3/2/2023    : 1940  Insurance: Payor: MEDICARE / Plan: MEDICARE PART A AND B / Product Type: *No Product type* /      Patient  verified yes     Visit #   Current / Total 8 12   Time   In / Out 12:22 1:00   Pain   In / Out 4-5 4   Subjective Functional Status/Changes: So So today   Changes to:  Meds, Allergies, Med Hx, Sx Hx? If yes, update Summary List no       TREATMENT AREA =  Pain in right ankle and joints of right foot [M25.571]  Primary osteoarthritis, unspecified ankle and foot [M19.079]    OBJECTIVE    Therapeutic Procedures: Tx Min Billable or 1:1 Min (if diff from Tx Min) Procedure, Rationale, Specifics    00396 Therapeutic Exercise (timed):  increase ROM, strength, coordination, balance, and proprioception to improve patient's ability to progress to PLOF and address remaining functional goals. (see flow sheet as applicable)     Details if applicable:       15 15 46060 Neuromuscular Re-Education (timed):  improve balance, coordination, kinesthetic sense, posture, core stability and proprioception to improve patient's ability to develop conscious control of individual muscles and awareness of position of extremities in order to progress to PLOF and address remaining functional goals.  (see flow sheet as applicable)     Details if applicable:  BIG Steps   41 43 [facility] Totals Reminder: bill using total billable min of TIMED therapeutic procedures (example: do not include dry needle or estim unattended, both untimed codes, in totals to left)  8-22 min = 1 unit; 23-37 min = 2 units; 38-52 min = 3 units; 53-67 min = 4 units; 68-82 min = 5 units   Total Total     [x]  Patient Education billed concurrently with other procedures   [x] Review HEP    [] Progressed/Changed HEP  [] Other:    Objective Information/Functional Measures/Assessment    Initiated BIG steps    Patient will continue to benefit from skilled PT services to modify and progress therapeutic interventions, analyze and address functional mobility deficits, analyze and address ROM deficits, analyze and address strength deficits, analyze and address soft tissue restrictions, analyze and modify for postural abnormalities, analyze and address imbalance/dizziness, and instruct in home and community integration to address functional deficits and attain remaining goals. Progress toward goals / Updated goals:  []  See Progress Note/Recertification    Will re-assess NV    PLAN  yes Continue plan of care  []  Upgrade activities as tolerated  []  Discharge due to :  []  Other:    Irma Singleton \"BJ\" Ken, DPT, Cert. MDT, Cert. DN, Cert. SMT, Dip.  Osteopractic    3/2/2023    12:25 PM    Future Appointments   Date Time Provider Lupe Lynne   3/2/2023 12:30 PM Riccardo Erwin, PT [facility]   3/7/2023 12:30 PM Riccardo Erwin, PT [facility]   3/9/2023 12:30 PM Riccardo Erwin, PT [facility]   3/14/2023  1:00 PM Riccardo Erwin, PT [facility]   3/16/2023  1:30 PM Maritza Piedra PT [facility]   3/21/2023 12:30 PM Riccardo Erwin, PT [facility]   3/23/2023  1:00 PM Maritza Piedra PT [facility]   3/28/2023 12:30 PM Riccardo Erwin, PT [facility]   3/30/2023 12:30 PM Cyrena Yaima, PT [facility]

## 2023-03-07 ENCOUNTER — HOSPITAL ENCOUNTER (OUTPATIENT)
Facility: HOSPITAL | Age: 83
Setting detail: RECURRING SERIES
Discharge: HOME | End: 2023-03-10
Payer: MEDICARE

## 2023-03-07 PROCEDURE — 97112 NEUROMUSCULAR REEDUCATION: CPT

## 2023-03-07 PROCEDURE — 97110 THERAPEUTIC EXERCISES: CPT

## 2023-03-07 NOTE — PROGRESS NOTES
PHYSICAL / OCCUPATIONAL THERAPY - DAILY TREATMENT NOTE (updated )    Patient Name: Todd Brothers    Date: 3/7/2023    : 1940  Insurance: Payor: MEDICARE / Plan: MEDICARE PART A AND B / Product Type: *No Product type* /      Patient  verified yes     Visit #   Current / Total 9 12   Time   In / Out 12:24 1:06   Pain   In / Out 5 4   Subjective Functional Status/Changes: Sometimes it doesn't hurt as bad   Changes to:  Meds, Allergies, Med Hx, Sx Hx? If yes, update Summary List no       TREATMENT AREA =  Pain in right ankle and joints of right foot [M25.571]  Primary osteoarthritis, unspecified ankle and foot [M19.079]    OBJECTIVE    Therapeutic Procedures: Tx Min Billable or 1:1 Min (if diff from Tx Min) Procedure, Rationale, Specifics    81610 Therapeutic Exercise (timed):  increase ROM, strength, coordination, balance, and proprioception to improve patient's ability to progress to PLOF and address remaining functional goals. (see flow sheet as applicable)     Details if applicable:       15 15 66118 Neuromuscular Re-Education (timed):  improve balance, coordination, kinesthetic sense, posture, core stability and proprioception to improve patient's ability to develop conscious control of individual muscles and awareness of position of extremities in order to progress to PLOF and address remaining functional goals.  (see flow sheet as applicable)     Details if applicable:  BIG Steps   41 43 [facility] Totals Reminder: bill using total billable min of TIMED therapeutic procedures (example: do not include dry needle or estim unattended, both untimed codes, in totals to left)  8-22 min = 1 unit; 23-37 min = 2 units; 38-52 min = 3 units; 53-67 min = 4 units; 68-82 min = 5 units   Total Total   [x]  Patient Education billed concurrently with other procedures   [x] Review HEP    [] Progressed/Changed HEP  [] Other:    Objective Information/Functional Measures/Assessment    Increased reps/sets/resistance per flow sheet. Patient will continue to benefit from skilled PT services to modify and progress therapeutic interventions, analyze and address functional mobility deficits, analyze and address ROM deficits, analyze and address strength deficits, analyze and address soft tissue restrictions, analyze and cue for proper movement patterns, and instruct in home and community integration to address functional deficits and attain remaining goals. Progress toward goals / Updated goals:  []  See Progress Note/Recertification    Fair balance with BIG steps    PLAN  yes Continue plan of care  []  Upgrade activities as tolerated  []  Discharge due to :  []  Other:    Angel Mullins \"BJ\" Ken, DPT, Cert. MDT, Cert. DN, Cert. SMT, Dip.  Osteopractic    3/7/2023    1:13 PM    Future Appointments   Date Time Provider Lupe Lynne   3/9/2023 12:30 PM Tray Hermosillo, PT [facility] [address]   3/14/2023  1:00 PM Tray Hermosillo PT [facility] [address]   3/16/2023  1:30 PM Warren Gerber, PT [facility] [address]   3/21/2023 12:30 PM Tray Hermosillo PT [facility] [address]   3/23/2023  1:00 PM Warren Gerber PT [facility] [address]   3/28/2023 12:30 PM Tray Hermosillo PT [facility] [address]   3/30/2023 12:30 PM Tray Hermosillo, PT [facility] [address]

## 2023-03-09 ENCOUNTER — HOSPITAL ENCOUNTER (OUTPATIENT)
Facility: HOSPITAL | Age: 83
Setting detail: RECURRING SERIES
Discharge: HOME | End: 2023-03-12
Payer: MEDICARE

## 2023-03-09 PROCEDURE — 97530 THERAPEUTIC ACTIVITIES: CPT

## 2023-03-09 PROCEDURE — 97110 THERAPEUTIC EXERCISES: CPT

## 2023-03-09 NOTE — PROGRESS NOTES
[facility]  [address] [address]   Phone: [phone] Fax: [phone]  PROGRESS NOTE  Patient Name: Jordi Castillo : 1940   Treatment/Medical Diagnosis: Pain in right ankle and joints of right foot [M25.571]  Primary osteoarthritis, unspecified ankle and foot [M19.079]   Referral Source: Ifeoma Joy MD     Date of Initial Visit: 23 Attended Visits: 10 Missed Visits: 0     SUMMARY OF TREATMENT  Patient's POC has consisted of therex, therapeutic activities, manual therapy prn, modalities prn, pt. education, and a comprehensive HEP. Treatment strategies used to address functional mobility deficits, ROM deficits, strength deficits, analyze and address soft tissue restrictions, analyze and cue movement patterns, analyze and modify body mechanics/ergonomics, assess and modify postural abnormalities and instruct in home and community integration. CURRENT STATUS  Patient making slow progress in pain relief, but has demonstrated improvement in gross LE strength, allowing for improved transfer tolerance and standing activity. He continues with shortened steps/altered gait pattern, and BIG PROGRAM steps have been incorporated into his care to facilitate coordination and balance. Patient's deconditioned state (as per others similar to his age) is the primary limiting factor to his current progression of care. GOALS/MEASURE OF PROGRESS Goal Met? 1. Patient to be adherent to HEP to facilitate pain control with ADL's.  2. Patient to improve TUG score to < 30\" to indicate improved independence and safety with household mobility. MET    MET     New Goals to be achieved in 2-4 weeks  1. Patient to be Safe and Independent with HEP to self-manage/prevent symptoms after DC. 2. Patient to increase FS FOTO score to > 57 to indicate increased functional independence.    3. Patient to improve FGA score to > 14 to indicate reduced risk for falls. 4. Patient to demonstrate safe stair negotiation in reciprocal pattern with <   1UE assist to facilitate community mobility. RECOMMENDATIONS  Continue and progress functional therex/therapeutic activity as able, utilizing manual therapy and modalities prn. Progress patient towards independent HEP to facilitate self-management of symptoms and progress gains after PT. If you have any questions/comments please contact us directly at [phone]   Thank you for allowing us to assist in the care of your patient. Therapist Signature: Yusuf Stephenson \"BJ\" Bk Barker, DPT, Cert. MDT, Cert. DN, Cert. SMT, Dip.  Osteopractic Date: 3/9/2023   Reporting Period   Certification Period 2/7/23-3/9/23  2/7/23-5/6/23 Time: 12:34 PM

## 2023-03-09 NOTE — PROGRESS NOTES
PHYSICAL / OCCUPATIONAL THERAPY - DAILY TREATMENT NOTE (updated )    Patient Name: Jose Connell    Date: 3/9/2023    : 1940  Insurance: Payor: MEDICARE / Plan: MEDICARE PART A AND B / Product Type: *No Product type* /      Patient  verified yes     Visit #   Current / Total 10 12+   Time   In / Out 12:30 12:55   Pain   In / Out 5 5   Subjective Functional Status/Changes: The foot pain is about the same   Changes to:  Meds, Allergies, Med Hx, Sx Hx?  If yes, update Summary List no       TREATMENT AREA =  Pain in right ankle and joints of right foot [M25.571]  Primary osteoarthritis, unspecified ankle and foot [M19.079]    OBJECTIVE    Therapeutic Procedures:  Tx Min Billable or 1:1 Min (if diff from Tx Min) Procedure, Rationale, Specifics   15 15 88147 Therapeutic Exercise (timed):  increase ROM, strength, coordination, balance, and proprioception to improve patient's ability to progress to PLOF and address remaining functional goals. (see flow sheet as applicable)     Details if applicable:       15 15 37691 Therapeutic Activity (timed):  use of dynamic activities replicating functional movements to increase ROM, strength, coordination, balance, and proprioception in order to improve patient's ability to progress to PLOF and address remaining functional goals.  (see flow sheet as applicable)     Details if applicable:     30 25 [facility] Totals Reminder: bill using total billable min of TIMED therapeutic procedures (example: do not include dry needle or estim unattended, both untimed codes, in totals to left)  8-22 min = 1 unit; 23-37 min = 2 units; 38-52 min = 3 units; 53-67 min = 4 units; 68-82 min = 5 units   Total Total     [x]  Patient Education billed concurrently with other procedures   [x] Review HEP    [] Progressed/Changed HEP  [] Other:    Objective Information/Functional Measures/Assessment    See PN    Patient will continue to benefit from skilled PT services to modify and progress  therapeutic interventions, analyze and address functional mobility deficits, analyze and address ROM deficits, analyze and address strength deficits, analyze and address soft tissue restrictions, and instruct in home and community integration to address functional deficits and attain remaining goals. Progress toward goals / Updated goals:  [x]  See Progress Note/Recertification    PLAN  yes Continue plan of care  []  Upgrade activities as tolerated  []  Discharge due to :  []  Other:    Graciela Rigo \"BJ\" Ken, DPT, Cert. MDT, Cert. DN, Cert. SMT, Dip.  Osteopractic    3/9/2023    12:33 PM    Future Appointments   Date Time Provider Lupe Lynne   3/14/2023  1:00 PM Achilles Tomas, PT [facility] [address]   3/16/2023  1:30 PM Romaine Leyden, PT [facility] [address]   3/21/2023 12:30 PM Achilles Tomas, PT [facility] [address]   3/23/2023  1:00 PM Romaine Leyden, PT [facility] [address]   3/28/2023 12:30 PM Achilles Tomas, PT [facility] [address]   3/30/2023 12:30 PM Achilles Tomas, PT [id number] [address]

## 2023-03-14 ENCOUNTER — HOSPITAL ENCOUNTER (OUTPATIENT)
Facility: HOSPITAL | Age: 83
Setting detail: RECURRING SERIES
Discharge: HOME | End: 2023-03-17
Payer: MEDICARE

## 2023-03-14 PROCEDURE — 97110 THERAPEUTIC EXERCISES: CPT

## 2023-03-14 PROCEDURE — 97112 NEUROMUSCULAR REEDUCATION: CPT

## 2023-03-14 PROCEDURE — 97530 THERAPEUTIC ACTIVITIES: CPT

## 2023-03-14 NOTE — PROGRESS NOTES
PHYSICAL / OCCUPATIONAL THERAPY - DAILY TREATMENT NOTE (updated )    Patient Name: Joey Rodriguez    Date: 3/14/2023    : 1940  Insurance: Payor: MEDICARE / Plan: MEDICARE PART A AND B / Product Type: *No Product type* /      Patient  verified yes     Visit #   Current / Total 11 18   Time   In / Out 1:00 1:38   Pain   In / Out 7 5   Subjective Functional Status/Changes: I'm doing a little bit better   Changes to:  Meds, Allergies, Med Hx, Sx Hx? If yes, update Summary List no       TREATMENT AREA =  Pain in right ankle and joints of right foot [M25.571]  Primary osteoarthritis, unspecified ankle and foot [M19.079]    OBJECTIVE    Therapeutic Procedures: Tx Min Billable or 1:1 Min (if diff from Tx Min) Procedure, Rationale, Specifics   20  14570 Therapeutic Exercise (timed):  increase ROM, strength, coordination, balance, and proprioception to improve patient's ability to progress to PLOF and address remaining functional goals. (see flow sheet as applicable)     Details if applicable:       10 10 35524 Therapeutic Activity (timed):  use of dynamic activities replicating functional movements to increase ROM, strength, coordination, balance, and proprioception in order to improve patient's ability to progress to PLOF and address remaining functional goals. (see flow sheet as applicable)     Details if applicable:     8  15927 Neuromuscular Re-Education (timed):  improve balance, coordination, kinesthetic sense, posture, core stability and proprioception to improve patient's ability to develop conscious control of individual muscles and awareness of position of extremities in order to progress to PLOF and address remaining functional goals.  (see flow sheet as applicable)     Details if applicable:  BIG Steps forward, Lateral   38 45 [facility] Totals Reminder: bill using total billable min of TIMED therapeutic procedures (example: do not include dry needle or estim unattended, both untimed codes, in totals to left)  8-22 min = 1 unit; 23-37 min = 2 units; 38-52 min = 3 units; 53-67 min = 4 units; 68-82 min = 5 units   Total Total     [x]  Patient Education billed concurrently with other procedures   [x] Review HEP    [] Progressed/Changed HEP  [] Other:    Objective Information/Functional Measures/Assessment    Flow sheet revised    Patient will continue to benefit from skilled PT services to modify and progress therapeutic interventions, analyze and address functional mobility deficits, analyze and address ROM deficits, analyze and address strength deficits, analyze and address soft tissue restrictions, analyze and cue for proper movement patterns, analyze and modify for postural abnormalities, analyze and address imbalance/dizziness, and instruct in home and community integration to address functional deficits and attain remaining goals. Progress toward goals / Updated goals:  []  See Progress Note/Recertification    1st session since progress note. No significant progress observed      PLAN  yes Continue plan of care  []  Upgrade activities as tolerated  []  Discharge due to :  []  Other:    Chapincito Posada \"BJ\" Ken, ESPERANZA, Cert. MDT, Cert. DN, Cert. SMT, Dip.  Osteopractic    3/14/2023    1:25 PM    Future Appointments   Date Time Provider Lupe Lynne   3/16/2023  1:30 PM Luis Duvall, PT [facility]   3/21/2023 12:30 PM David Wilkins PT [facility]   3/23/2023  1:00 PM Luis Duvall, PT [facility]   3/28/2023 12:30 PM David Wilkins PT [facility]   3/30/2023 12:30 PM [email or web address]

## 2023-03-16 ENCOUNTER — HOSPITAL ENCOUNTER (OUTPATIENT)
Facility: HOSPITAL | Age: 83
Setting detail: RECURRING SERIES
Discharge: HOME | End: 2023-03-19
Payer: MEDICARE

## 2023-03-16 PROCEDURE — 97110 THERAPEUTIC EXERCISES: CPT

## 2023-03-16 PROCEDURE — 97112 NEUROMUSCULAR REEDUCATION: CPT

## 2023-03-16 NOTE — PROGRESS NOTES
Measures/Assessment    Added high knee and retro gait. Pt unsteady with retro gait, requiring intermittent CGA for balance. Pt demo's slow nathanael and decreased stride length during retro gait. Static standing balance   EO MSR GT 30\" B   EC ROM 30\"   EC MSR instep 30\" B (+) sway     Patient will continue to benefit from skilled PT services to modify and progress therapeutic interventions, analyze and address functional mobility deficits, analyze and address strength deficits, analyze and cue for proper movement patterns, analyze and modify for postural abnormalities, analyze and address imbalance/dizziness, and instruct in home and community integration to address functional deficits and attain remaining goals. Progress toward goals / Updated goals:  []  See Progress Note/Recertification    1. Patient to be Safe and Independent with HEP to self-manage/prevent symptoms after DC. 2. Patient to increase FS FOTO score to > 57 to indicate increased functional independence. 3. Patient to improve FGA score to > 14 to indicate reduced risk for falls. High knee and retro gait   4. Patient to demonstrate safe stair negotiation in reciprocal pattern with <   1UE assist to facilitate community mobility.        PLAN  yes Continue plan of care  [x]  Upgrade activities as tolerated  []  Discharge due to :  []  Other:    Ana Maria Bran, PTA    3/16/2023    2:37 PM    Future Appointments   Date Time Provider Lupe Lynne   3/21/2023 12:30 PM Subhash Armstrong, PT [facility]   3/23/2023  1:00 PM Jose Freitas PT [facility]   3/28/2023 12:30 PM Subhash Armstrong PT [facility]   3/30/2023 12:30 PM Subhash Armstrong, PT [facility]

## 2023-03-21 ENCOUNTER — HOSPITAL ENCOUNTER (OUTPATIENT)
Facility: HOSPITAL | Age: 83
Setting detail: RECURRING SERIES
Discharge: HOME | End: 2023-03-24
Payer: MEDICARE

## 2023-03-21 PROCEDURE — 97110 THERAPEUTIC EXERCISES: CPT

## 2023-03-21 PROCEDURE — 97112 NEUROMUSCULAR REEDUCATION: CPT

## 2023-03-23 ENCOUNTER — HOSPITAL ENCOUNTER (OUTPATIENT)
Facility: HOSPITAL | Age: 83
Setting detail: RECURRING SERIES
Discharge: HOME | End: 2023-03-26
Payer: MEDICARE

## 2023-03-23 PROCEDURE — 97112 NEUROMUSCULAR REEDUCATION: CPT

## 2023-03-23 PROCEDURE — 97110 THERAPEUTIC EXERCISES: CPT

## 2023-03-23 NOTE — PROGRESS NOTES
PHYSICAL THERAPY - DAILY TREATMENT NOTE (updated )    Patient Name: Alvarado Le    Date: 3/23/2023    : 1940  Insurance: Payor: MEDICARE / Plan: MEDICARE PART A AND B / Product Type: *No Product type* /      Patient  verified yes     Visit #   Current / Total 14 18   Time   In / Out 1:00 1:29   Pain   In / Out 5/10 5/10   Subjective Functional Status/Changes: \"I got different shoes and I think these are a little better than my other ones. \"   Changes to:  Meds, Allergies, Med Hx, Sx Hx? If yes, update Summary List no       TREATMENT AREA =  Pain in right ankle and joints of right foot [M25.571]  Primary osteoarthritis, unspecified ankle and foot [M19.079]    OBJECTIVE    Therapeutic Procedures: Tx Min Billable or 1:1 Min (if diff from Tx Min) Procedure, Rationale, Specifics   21  91719 Therapeutic Exercise (timed):  increase ROM, strength, coordination, balance, and proprioception to improve patient's ability to progress to PLOF and address remaining functional goals. (see flow sheet as applicable)     Details if applicable:       8  58590 Neuromuscular Re-Education (timed):  improve balance, coordination, kinesthetic sense, posture, core stability and proprioception to improve patient's ability to develop conscious control of individual muscles and awareness of position of extremities in order to progress to PLOF and address remaining functional goals.  (see flow sheet as applicable)     Details if applicable:  Eli Champion   34  [facility] Totals Reminder: bill using total billable min of TIMED therapeutic procedures (example: do not include dry needle or estim unattended, both untimed codes, in totals to left)  8-22 min = 1 unit; 23-37 min = 2 units; 38-52 min = 3 units; 53-67 min = 4 units; 68-82 min = 5 units   Total Total     [x]  Patient Education billed concurrently with other procedures   [x] Review HEP    [] Progressed/Changed HEP, detail:    [] Other detail:       Objective

## 2023-03-28 ENCOUNTER — HOSPITAL ENCOUNTER (OUTPATIENT)
Facility: HOSPITAL | Age: 83
Setting detail: RECURRING SERIES
Discharge: HOME | End: 2023-03-31
Payer: MEDICARE

## 2023-03-28 PROCEDURE — 97110 THERAPEUTIC EXERCISES: CPT

## 2023-03-28 PROCEDURE — 97530 THERAPEUTIC ACTIVITIES: CPT

## 2023-03-28 PROCEDURE — 97112 NEUROMUSCULAR REEDUCATION: CPT

## 2023-03-28 NOTE — PROGRESS NOTES
= 1 unit; 23-37 min = 2 units; 38-52 min = 3 units; 53-67 min = 4 units; 68-82 min = 5 units   Total Total     [x]  Patient Education billed concurrently with other procedures   [x] Review HEP    [] Progressed/Changed HEP  [] Other:    Objective Information/Functional Measures/Assessment    Discussed DC NV, patient agreed. Patient will continue to benefit from skilled PT services to instruct in home and community integration to address functional deficits and attain remaining goals. Progress toward goals / Updated goals:  []  See Progress Note/Recertification    Will DC NV    PLAN  yes Continue plan of care  []  Upgrade activities as tolerated  []  Discharge due to :  []  Other:    Krissy Chavez \"BJ\" ESPERANZA Rogers, Cert. MDT, Cert. DN, Cert. SMT, Dip.  Osteopractic    3/28/2023    12:59 PM    Future Appointments   Date Time Provider Lupe Lynne   3/30/2023 12:30 PM Eli Juarez, PT [facility]

## 2023-03-30 ENCOUNTER — HOSPITAL ENCOUNTER (OUTPATIENT)
Facility: HOSPITAL | Age: 83
Setting detail: RECURRING SERIES
End: 2023-03-30
Payer: MEDICARE

## 2023-03-30 PROCEDURE — 97110 THERAPEUTIC EXERCISES: CPT

## 2023-03-30 PROCEDURE — 97535 SELF CARE MNGMENT TRAINING: CPT

## 2023-03-30 PROCEDURE — 97530 THERAPEUTIC ACTIVITIES: CPT

## 2023-03-30 NOTE — PROGRESS NOTES
[facility]ERAP[facility]  [address] [place] [address] [address], [address] - Phone: [phone]  Fax: [phone] SUMMARY  Patient Name: Danita Maguire : 1940   Treatment/Medical Diagnosis: Pain in right ankle and joints of right foot [M25.571]  Primary osteoarthritis, unspecified ankle and foot [M19.079]   Referral Source: Mario Hancock MD     Date of Initial Visit: 23 Attended Visits: 16 Missed Visits: 0     SUMMARY OF TREATMENT  Patient's POC has consisted of therex, therapeutic activities, manual therapy prn, modalities prn, pt. education, and a comprehensive HEP. Treatment strategies used to address functional mobility deficits, ROM deficits, strength deficits, analyze and address soft tissue restrictions, analyze and cue movement patterns, analyze and modify body mechanics/ergonomics, assess and modify postural abnormalities and instruct in home and community integration. CURRENT STATUS  Patient continues to average 5/10 pain on VAS, but has made excellent progress in improving his balance and safety with ambulation/ADL's. FTSS score improved from 20\" to 16\", TUG score improved from 40\" to 30\", and FGA improved from 6 to 14 points. GOALS/MEASURE OF PROGRESS Goal Met? 1. Patient to be Safe and Independent with HEP to self-manage/prevent symptoms after DC. 2. Patient to increase FS FOTO score to > 57 to indicate increased functional independence. 3. Patient to improve FGA score to > 14 to indicate reduced risk for falls. 4. Patient to demonstrate safe stair negotiation in reciprocal pattern with <   1UE assist to facilitate community mobility. MET      MET      MET    2 UE       RECOMMENDATIONS  Patient has met/progressing towards meeting all clinical & personal goals and is now ready to be DC'd with a comprehensive HEP. If you have any questions/comments please contact us directly at [phone].    Thank you for allowing us to assist in the care of your patient. Therapist Signature: Gary Ledbetter \"BJ\" Bonifacio Daigle, DPT, Cert. MDT, Cert. DN, Cert. SMT, Dip. Osteopractic Date: 3/30/23   Reporting Period:  Certification Period: 3/9/23-3/30/23  2/7/23-3/30/23 Time: 12:46 PM     NOTE TO PHYSICIAN:  PLEASE COMPLETE THE ORDERS BELOW AND FAX TO   [facility] Physical Therapy: [phone]  If you are unable to process this request in 24 hours please contact our office: [phone]    ___ I have read the above report and request that my patient continue as recommended.   ___ I have read the above report and request that my patient continue therapy with the following changes/special instructions:_________________________________________________________   ___ I have read the above report and request that my patient be discharged from therapy.      Physician Signature:        Date:     Time:

## 2023-03-30 NOTE — PROGRESS NOTES
Information/Functional Measures/Assessment    See DC    Progress toward goals / Updated goals:  []  See Progress Note/Recertification    See dC    PLAN  no Continue plan of care  []  Upgrade activities as tolerated  [x]  Discharge due to : See DC  []  Other:    Mary Woodard \"BJ\" SHASHA RogersT, Cert. MDT, Cert. DN, Cert. SMT, Dip. Osteopractic    3/30/2023    12:45 PM    No future appointments.

## 2023-06-19 ENCOUNTER — HOSPITAL ENCOUNTER (OUTPATIENT)
Facility: HOSPITAL | Age: 83
Setting detail: RECURRING SERIES
Discharge: HOME | End: 2023-06-22
Payer: MEDICARE

## 2023-06-19 PROCEDURE — 97112 NEUROMUSCULAR REEDUCATION: CPT

## 2023-06-19 PROCEDURE — 97110 THERAPEUTIC EXERCISES: CPT

## 2023-06-19 PROCEDURE — 97530 THERAPEUTIC ACTIVITIES: CPT

## 2023-06-19 NOTE — PROGRESS NOTES
PHYSICAL / OCCUPATIONAL THERAPY - DAILY TREATMENT NOTE (updated )    Patient Name: Guera Wayne    Date: 2023    : 1940  Insurance: Payor: MEDICARE / Plan: MEDICARE PART A AND B / Product Type: *No Product type* /      Patient  verified yes     Visit #   Current / Total 4 8-12   Time   In / Out 12:40 1:20   Pain   In / Out 710 10   Subjective Functional Status/Changes: Pt notes minimal functional change, states he continues to experience pain his b/l LE.     TREATMENT AREA =  Pain in left leg [M79.605]  Pain in right leg [M79.604]    OBJECTIVE    Therapeutic Procedures: Tx Min Billable or 1:1 Min (if diff from Tx Min) Procedure, Rationale, Specifics   15 15 90897 Therapeutic Exercise (timed):  increase ROM, strength, coordination, balance, and proprioception to improve patient's ability to progress to PLOF and address remaining functional goals. (see flow sheet as applicable)     Details if applicable:       15 15 09084 Therapeutic Activity (timed):  use of dynamic activities replicating functional movements to increase ROM, strength, coordination, balance, and proprioception in order to improve patient's ability to progress to PLOF and address remaining functional goals. (see flow sheet as applicable)     Details if applicable:     10 10 84429 Neuromuscular Re-Education (timed):  improve balance, coordination, kinesthetic sense, posture, core stability and proprioception to improve patient's ability to develop conscious control of individual muscles and awareness of position of extremities in order to progress to PLOF and address remaining functional goals.  (see flow sheet as applicable)     Details if applicable:     40 40 [address] [place] [address] Way Reminder: bill using total billable min of TIMED therapeutic procedures (example: do not include dry needle or estim unattended, both untimed codes, in totals to left)  8-22 min = 1 unit; 23-37 min = 2 units; 38-52 min = 3 units; 53-67 min = 4 units;

## 2023-06-22 ENCOUNTER — HOSPITAL ENCOUNTER (OUTPATIENT)
Facility: HOSPITAL | Age: 83
Setting detail: RECURRING SERIES
Discharge: HOME | End: 2023-06-25
Payer: MEDICARE

## 2023-06-22 PROCEDURE — 97112 NEUROMUSCULAR REEDUCATION: CPT

## 2023-06-22 PROCEDURE — 97530 THERAPEUTIC ACTIVITIES: CPT

## 2023-06-22 PROCEDURE — 97110 THERAPEUTIC EXERCISES: CPT

## 2023-06-26 ENCOUNTER — APPOINTMENT (OUTPATIENT)
Facility: HOSPITAL | Age: 83
End: 2023-06-26
Payer: MEDICARE

## 2023-06-28 ENCOUNTER — HOSPITAL ENCOUNTER (OUTPATIENT)
Facility: HOSPITAL | Age: 83
Setting detail: RECURRING SERIES
Discharge: HOME | End: 2023-07-01
Payer: MEDICARE

## 2023-06-28 PROCEDURE — 97112 NEUROMUSCULAR REEDUCATION: CPT

## 2023-06-28 PROCEDURE — 97530 THERAPEUTIC ACTIVITIES: CPT

## 2023-06-28 PROCEDURE — 97110 THERAPEUTIC EXERCISES: CPT

## 2023-06-29 ENCOUNTER — HOSPITAL ENCOUNTER (OUTPATIENT)
Facility: HOSPITAL | Age: 83
Setting detail: RECURRING SERIES
End: 2023-06-29
Payer: MEDICARE

## 2023-06-29 PROCEDURE — 97530 THERAPEUTIC ACTIVITIES: CPT

## 2023-06-29 PROCEDURE — 97110 THERAPEUTIC EXERCISES: CPT

## 2023-07-03 ENCOUNTER — HOSPITAL ENCOUNTER (OUTPATIENT)
Facility: HOSPITAL | Age: 83
Setting detail: RECURRING SERIES
Discharge: HOME | End: 2023-07-06
Payer: MEDICARE

## 2023-07-03 PROCEDURE — 97112 NEUROMUSCULAR REEDUCATION: CPT

## 2023-07-03 PROCEDURE — 97110 THERAPEUTIC EXERCISES: CPT

## 2023-07-03 PROCEDURE — 97530 THERAPEUTIC ACTIVITIES: CPT

## 2023-07-03 NOTE — PROGRESS NOTES
PHYSICAL / OCCUPATIONAL THERAPY - DAILY TREATMENT NOTE (updated )    Patient Name: Khurram Anderson    Date: 7/3/2023    : 1940  Insurance: Payor: MEDICARE / Plan: MEDICARE PART A AND B / Product Type: *No Product type* /      Patient  verified yes     Visit #   Current / Total 8 12-24   Time   In / Out 12:36 1:04   Pain   In / Out 8 4   Subjective Functional Status/Changes: I don't know what happened to me but yesterday I woke up with a lot of pain in my right hip. TREATMENT AREA =  Pain in left leg [M79.605]  Pain in right leg [M79.604]    OBJECTIVE    Therapeutic Procedures: Tx Min Billable or 1:1 Min (if diff from Tx Min) Procedure, Rationale, Specifics   15  78472 Therapeutic Exercise (timed):  increase ROM, strength, coordination, balance, and proprioception to improve patient's ability to progress to PLOF and address remaining functional goals. (see flow sheet as applicable)     Details if applicable:       15  29958 Therapeutic Activity (timed):  use of dynamic activities replicating functional movements to increase ROM, strength, coordination, balance, and proprioception in order to improve patient's ability to progress to PLOF and address remaining functional goals. (see flow sheet as applicable)     Details if applicable:     8  83525 Neuromuscular Re-Education (timed):  improve balance, coordination, kinesthetic sense, posture, core stability and proprioception to improve patient's ability to develop conscious control of individual muscles and awareness of position of extremities in order to progress to PLOF and address remaining functional goals.  (see flow sheet as applicable)     Details if applicable:     45  [facility] Totals Reminder: bill using total billable min of TIMED therapeutic procedures (example: do not include dry needle or estim unattended, both untimed codes, in totals to left)  8-22 min = 1 unit; 23-37 min = 2 units; 38-52 min = 3 units; 53-67 min = 4 units; 68-82

## 2023-07-05 ENCOUNTER — HOSPITAL ENCOUNTER (OUTPATIENT)
Facility: HOSPITAL | Age: 83
Setting detail: RECURRING SERIES
Discharge: HOME | End: 2023-07-08
Payer: MEDICARE

## 2023-07-05 PROCEDURE — 97530 THERAPEUTIC ACTIVITIES: CPT

## 2023-07-05 PROCEDURE — 97110 THERAPEUTIC EXERCISES: CPT

## 2023-07-05 PROCEDURE — 97112 NEUROMUSCULAR REEDUCATION: CPT

## 2023-07-05 NOTE — PROGRESS NOTES
PHYSICAL / OCCUPATIONAL THERAPY - DAILY TREATMENT NOTE (updated )    Patient Name: Josiah Jones    Date: 2023    : 1940  Insurance: Payor: MEDICARE / Plan: MEDICARE PART A AND B / Product Type: *No Product type* /      Patient  verified yes     Visit #   Current / Total 9 8-12   Time   In / Out 2:00 2:40   Pain   In / Out 6-7 5   Subjective Functional Status/Changes: Pt still is having pain in right hip, as noted last session. TREATMENT AREA =  Pain in left leg [M79.605]  Pain in right leg [M79.604]    OBJECTIVE    Therapeutic Procedures: Tx Min Procedure, Rationale, Specifics   15 [id number] Therapeutic Exercise (timed):  increase ROM, strength, coordination, balance, and proprioception to improve patient's ability to progress to PLOF and address remaining functional goals. (see flow sheet as applicable)     Details if applicable:       15 57483 Therapeutic Activity (timed):  use of dynamic activities replicating functional movements to increase ROM, strength, coordination, balance, and proprioception in order to improve patient's ability to progress to PLOF and address remaining functional goals. (see flow sheet as applicable)     Details if applicable:     10 38151 Neuromuscular Re-Education (timed):  improve balance, coordination, kinesthetic sense, posture, core stability and proprioception to improve patient's ability to develop conscious control of individual muscles and awareness of position of extremities in order to progress to PLOF and address remaining functional goals. (see flow sheet as applicable)     Details if applicable: Total  40       [x]  Patient Education billed concurrently with other procedures   [x] Review HEP    [] Progressed/Changed HEP  [] Other:    Objective Information/Functional Measures/Assessment    Introduced standing hip abduction and pt tolerated well.   Pt required minimal VC's for therex protocol, just verbalized fatigue with the

## 2023-07-10 ENCOUNTER — HOSPITAL ENCOUNTER (OUTPATIENT)
Facility: HOSPITAL | Age: 83
Setting detail: RECURRING SERIES
Discharge: HOME | End: 2023-07-13
Payer: MEDICARE

## 2023-07-10 PROCEDURE — 97530 THERAPEUTIC ACTIVITIES: CPT

## 2023-07-10 PROCEDURE — 97110 THERAPEUTIC EXERCISES: CPT

## 2023-07-10 PROCEDURE — 97112 NEUROMUSCULAR REEDUCATION: CPT

## 2023-07-10 NOTE — PROGRESS NOTES
PHYSICAL / OCCUPATIONAL THERAPY - DAILY TREATMENT NOTE (updated )    Patient Name: Dio Sawyer    Date: 7/10/2023    : 1940  Insurance: Payor: MEDICARE / Plan: MEDICARE PART A AND B / Product Type: *No Product type* /      Patient  verified yes     Visit #   Current / Total 10 8-12   Time   In / Out 12:38 1:20   Pain   In / Out 4 4   Subjective Functional Status/Changes: Pt denies any change since last visit     TREATMENT AREA =  Pain in left leg [M79.605]  Pain in right leg [M79.604]    OBJECTIVE    Therapeutic Procedures: Tx Min Procedure, Rationale, Specifics   15 [id number] Therapeutic Exercise (timed):  increase ROM, strength, coordination, balance, and proprioception to improve patient's ability to progress to PLOF and address remaining functional goals. (see flow sheet as applicable)     Details if applicable:       15 33610 Therapeutic Activity (timed):  use of dynamic activities replicating functional movements to increase ROM, strength, coordination, balance, and proprioception in order to improve patient's ability to progress to PLOF and address remaining functional goals. (see flow sheet as applicable)     Details if applicable:     12 34016 Neuromuscular Re-Education (timed):  improve balance, coordination, kinesthetic sense, posture, core stability and proprioception to improve patient's ability to develop conscious control of individual muscles and awareness of position of extremities in order to progress to PLOF and address remaining functional goals. (see flow sheet as applicable)     Details if applicable:      Total  42       [x]  Patient Education billed concurrently with other procedures   [x] Review HEP    [] Progressed/Changed HEP  [] Other:    Objective Information/Functional Measures/Assessment    See PN  Required mod VC's and demo for standing hip abduction to prevent side bending compensation    Patient will continue to benefit from skilled PT services to modify and

## 2023-07-10 NOTE — PROGRESS NOTES
[address] [facility],6[address] [facility] AT [facility]  [address]   Phone: [phone] Fax: [phone]  PROGRESS NOTE  Patient Name: Maren Xavier : 1940   Treatment/Medical Diagnosis: Pain in left leg [M79.605]  Pain in right leg [M79.604]   Referral Source: Elio Salazar MD     Date of Initial Visit: 23 Attended Visits: 10 Missed Visits: 0     SUMMARY OF TREATMENT  Patient's POC has consisted of therex, therapeutic activities, manual therapy prn, modalities prn, pt. education, and a comprehensive HEP. Treatment strategies used to address functional mobility deficits, ROM deficits, strength deficits, analyze and address soft tissue restrictions, analyze and cue movement patterns, analyze and modify body mechanics/ergonomics, assess and modify postural abnormalities and instruct in home and community integration. CURRENT STATUS  Patient making steady progress in improving BLE strength. He has improved his bed mobility independence and is able to rise from sitting with minimal assistance. He continues to be deconditioned and limited in his standing activity tolerance. GOALS/MEASURE OF PROGRESS Goal Met? Patient to be adherent to HEP to facilitate pain control with ADL's.  -Status at IE- HEP initiated. 2.   Patient to demonstrate minimal assistance in ability to rise from sitting.   -Status at IE- requires mod/max assistance to rise from seat. 3.   Patient to demonstrate independent bed mobility without worsening of BLE symptoms  -Status at IE- Patient reports difficulty getting in/out of bed and requires mod assist due to BLE pain. MET      MET        MET     Medicare, cannot change goals, cannot adjust frequency/duration, no signature required     RECOMMENDATIONS  Continue therapy per initial Plan of Care or most recent Medicare Recert.       If you have any questions/comments please contact us directly at

## 2023-07-12 ENCOUNTER — HOSPITAL ENCOUNTER (OUTPATIENT)
Facility: HOSPITAL | Age: 83
Setting detail: RECURRING SERIES
Discharge: HOME | End: 2023-07-15
Payer: MEDICARE

## 2023-07-12 PROCEDURE — 97112 NEUROMUSCULAR REEDUCATION: CPT

## 2023-07-12 PROCEDURE — 97110 THERAPEUTIC EXERCISES: CPT

## 2023-07-12 PROCEDURE — 97530 THERAPEUTIC ACTIVITIES: CPT

## 2023-07-12 NOTE — PROGRESS NOTES
PHYSICAL / OCCUPATIONAL THERAPY - DAILY TREATMENT NOTE (updated )    Patient Name: Tal Farmer    Date: 2023    : 1940  Insurance: Payor: MEDICARE / Plan: MEDICARE PART A AND B / Product Type: *No Product type* /      Patient  verified yes     Visit #   Current / Total 11 8-12   Time   In / Out 12:42 1:22   Pain   In / Out 3 0   Subjective Functional Status/Changes: Pt reports feeling better coming into treatment today     TREATMENT AREA =  Pain in left leg [M79.605]  Pain in right leg [M79.604]    OBJECTIVE    Therapeutic Procedures: Tx Min Procedure, Rationale, Specifics   15 [id number] Therapeutic Exercise (timed):  increase ROM, strength, coordination, balance, and proprioception to improve patient's ability to progress to PLOF and address remaining functional goals. (see flow sheet as applicable)     Details if applicable:       15 86986 Therapeutic Activity (timed):  use of dynamic activities replicating functional movements to increase ROM, strength, coordination, balance, and proprioception in order to improve patient's ability to progress to PLOF and address remaining functional goals. (see flow sheet as applicable)     Details if applicable:     10 02454 Neuromuscular Re-Education (timed):  improve balance, coordination, kinesthetic sense, posture, core stability and proprioception to improve patient's ability to develop conscious control of individual muscles and awareness of position of extremities in order to progress to PLOF and address remaining functional goals. (see flow sheet as applicable)     Details if applicable: Total  40       [x]  Patient Education billed concurrently with other procedures   [x] Review HEP    [] Progressed/Changed HEP  [] Other:    Objective Information/Functional Measures/Assessment    Introduced incline board stretch and pt reported relief of pain, though could only tolerate holding for 5 seconds each time.   Pt required min cueing for the rest

## 2023-07-17 ENCOUNTER — HOSPITAL ENCOUNTER (OUTPATIENT)
Facility: HOSPITAL | Age: 83
Setting detail: RECURRING SERIES
Discharge: HOME | End: 2023-07-20
Payer: MEDICARE

## 2023-07-17 PROCEDURE — 97112 NEUROMUSCULAR REEDUCATION: CPT

## 2023-07-17 PROCEDURE — 97530 THERAPEUTIC ACTIVITIES: CPT

## 2023-07-17 PROCEDURE — 97110 THERAPEUTIC EXERCISES: CPT

## 2023-07-17 NOTE — PROGRESS NOTES
PHYSICAL / OCCUPATIONAL THERAPY - DAILY TREATMENT NOTE (updated )    Patient Name: Maren Batch    Date: 2023    : 1940  Insurance: Payor: MEDICARE / Plan: MEDICARE PART A AND B / Product Type: *No Product type* /      Patient  verified yes     Visit #   Current / Total 12 8-12   Time   In / Out 12:38 1:20   Pain   In / Out 2 2   Subjective Functional Status/Changes: Pt has been using a cane since the weekend, but forgot it in his car today. Overall, feels that his pain is decreasing. TREATMENT AREA =  Pain in left leg [M79.605]  Pain in right leg [M79.604]    OBJECTIVE    Therapeutic Procedures: Tx Min Procedure, Rationale, Specifics   20 [id number] Therapeutic Exercise (timed):  increase ROM, strength, coordination, balance, and proprioception to improve patient's ability to progress to PLOF and address remaining functional goals. (see flow sheet as applicable)     Details if applicable:       10 96476 Therapeutic Activity (timed):  use of dynamic activities replicating functional movements to increase ROM, strength, coordination, balance, and proprioception in order to improve patient's ability to progress to PLOF and address remaining functional goals. (see flow sheet as applicable)     Details if applicable:     12 13162 Neuromuscular Re-Education (timed):  improve balance, coordination, kinesthetic sense, posture, core stability and proprioception to improve patient's ability to develop conscious control of individual muscles and awareness of position of extremities in order to progress to PLOF and address remaining functional goals. (see flow sheet as applicable)     Details if applicable: Total  42       [x]  Patient Education billed concurrently with other procedures   [x] Review HEP    [] Progressed/Changed HEP  [] Other:    Objective Information/Functional Measures/Assessment    Pt reports that his overall pain has been decreasing with doing the exercises repeatedly.    Pt

## 2023-07-19 ENCOUNTER — HOSPITAL ENCOUNTER (OUTPATIENT)
Facility: HOSPITAL | Age: 83
Setting detail: RECURRING SERIES
Discharge: HOME | End: 2023-07-22
Payer: MEDICARE

## 2023-07-19 PROCEDURE — 97110 THERAPEUTIC EXERCISES: CPT

## 2023-07-19 PROCEDURE — 97112 NEUROMUSCULAR REEDUCATION: CPT

## 2023-07-19 PROCEDURE — 97530 THERAPEUTIC ACTIVITIES: CPT

## 2023-07-19 NOTE — PROGRESS NOTES
during movements without c/o increase p! Pt requesting to leave due to \"rain is coming\", TC TE per flow sheet    Frequent rest breaks, stated he needed after therapist asked if necessary       Tap ups without UE assist without LOB   Patient will continue to benefit from skilled PT services to modify and progress therapeutic interventions, analyze and address functional mobility deficits, analyze and address ROM deficits, analyze and address strength deficits, analyze and address soft tissue restrictions, analyze and cue for proper movement patterns, analyze and modify for postural abnormalities, and instruct in home and community integration to address functional deficits and attain remaining goals. Progress toward goals / Updated goals:  []  See Progress Note/Recertification  Long Term Goals: To be accomplished in 8-12 weeks from 7/10/23  Patient to be Safe and Independent with HEP to self-manage/prevent symptoms after DC. Patient to increase FS FOTO score to > 59 to indicate increased functional independence.   -Status at IE- FS FOTO score = 35  3. Patient to demonstrate > 15' uninterrupted standing activity tolerance.  Limited, request to seated rest after completion of ea TE  -Status at IE- unable to tolerate >5' standing due to BLE pain     PLAN  yes Continue plan of care  [x]  Upgrade activities as tolerated  []  Discharge: See DC Note  []  Other:    Endy Becerra, PTA  7/19/2023    12:59 PM    Future Appointments   Date Time Provider [address]   7/24/2023 12:40 PM Ricky Cabal [facility]   7/26/2023 12:40 PM [facility]   7/31/2023 12:40 PM Peyton Chase, PT [facility]

## 2023-07-24 ENCOUNTER — APPOINTMENT (OUTPATIENT)
Facility: HOSPITAL | Age: 83
End: 2023-07-24
Payer: MEDICARE

## 2023-07-26 ENCOUNTER — HOSPITAL ENCOUNTER (OUTPATIENT)
Facility: HOSPITAL | Age: 83
Setting detail: RECURRING SERIES
Discharge: HOME | End: 2023-07-29
Payer: MEDICARE

## 2023-07-26 PROCEDURE — 97112 NEUROMUSCULAR REEDUCATION: CPT

## 2023-07-26 PROCEDURE — 97530 THERAPEUTIC ACTIVITIES: CPT

## 2023-07-26 PROCEDURE — 97110 THERAPEUTIC EXERCISES: CPT

## 2023-07-26 NOTE — PROGRESS NOTES
Kelsi Kwok, PT [address]   8/28/2023  1:20 PM Kelsi Kwok PT [address]   8/30/2023  1:20 PM Kelsi Kwok PT [address]

## 2023-07-31 ENCOUNTER — HOSPITAL ENCOUNTER (OUTPATIENT)
Facility: HOSPITAL | Age: 83
Setting detail: RECURRING SERIES
Discharge: HOME | End: 2023-08-03
Payer: MEDICARE

## 2023-07-31 PROCEDURE — 97530 THERAPEUTIC ACTIVITIES: CPT

## 2023-07-31 PROCEDURE — 97110 THERAPEUTIC EXERCISES: CPT

## 2023-07-31 PROCEDURE — 97116 GAIT TRAINING THERAPY: CPT

## 2023-07-31 NOTE — PROGRESS NOTES
PHYSICAL / OCCUPATIONAL THERAPY - DAILY TREATMENT NOTE (updated )    Patient Name: Rhett Branch    Date: 2023    : 1940  Insurance: Payor: MEDICARE / Plan: MEDICARE PART A AND B / Product Type: *No Product type* /      Patient  verified yes     Visit #   Current / Total 15 24   Time   In / Out 12:35 1:15   Pain   In / Out 3 0   Subjective Functional Status/Changes: I brought my cane with me so you can help me with it. TREATMENT AREA =  Pain in left leg [M79.605]  Pain in right leg [M79.604]    OBJECTIVE    Therapeutic Procedures: Tx Min Billable or 1:1 Min (if diff from Tx Min) Procedure, Rationale, Specifics   15  42113 Therapeutic Exercise (timed):  increase ROM, strength, coordination, balance, and proprioception to improve patient's ability to progress to PLOF and address remaining functional goals. (see flow sheet as applicable)     Details if applicable:       15  94939 Therapeutic Activity (timed):  use of dynamic activities replicating functional movements to increase ROM, strength, coordination, balance, and proprioception in order to improve patient's ability to progress to PLOF and address remaining functional goals. (see flow sheet as applicable)     Details if applicable:     10  41337 Gait Training (timed):    60 feet with SBQC (assistive device) over even surfaces with minimal level of assist. Cuing for step pattern. To improve safety and dynamic movement with household/community ambulation.   (see flow sheet as applicable)     Details if applicable:     36  [facility] Totals Reminder: bill using total billable min of TIMED therapeutic procedures (example: do not include dry needle or estim unattended, both untimed codes, in totals to left)  8-22 min = 1 unit; 23-37 min = 2 units; 38-52 min = 3 units; 53-67 min = 4 units; 68-82 min = 5 units   Total Total   [x]  Patient Education billed concurrently with other procedures   [x] Review HEP    [] Progressed/Changed HEP  []

## 2023-08-02 ENCOUNTER — HOSPITAL ENCOUNTER (OUTPATIENT)
Facility: HOSPITAL | Age: 83
Setting detail: RECURRING SERIES
Discharge: HOME | End: 2023-08-05
Payer: MEDICARE

## 2023-08-02 PROCEDURE — 97530 THERAPEUTIC ACTIVITIES: CPT

## 2023-08-02 PROCEDURE — 97110 THERAPEUTIC EXERCISES: CPT

## 2023-08-02 PROCEDURE — 97116 GAIT TRAINING THERAPY: CPT

## 2023-08-02 NOTE — PROGRESS NOTES
procedures   [x] Review HEP    [] Progressed/Changed HEP  [] Other:    Objective Information/Functional Measures/Assessment    Resumed mat therex due to fatigue    Patient will continue to benefit from skilled PT services to modify and progress therapeutic interventions, analyze and address functional mobility deficits, analyze and address ROM deficits, analyze and address strength deficits, analyze and address soft tissue restrictions, analyze and cue for proper movement patterns, and instruct in home and community integration to address functional deficits and attain remaining goals. Progress toward goals / Updated goals:  []  See Progress Note/Recertification    Good progress in use of SBQC    Next PN/ RC due 7/10/23  Auth due 9/8/23    PLAN  yes Continue plan of care  []  Upgrade activities as tolerated  []  Discharge: See DC Note  []  Other:    Mary Perez \"BJ\" ESPERANZA Rogers, Cert. MDT, Cert. DN, Cert. SMT, Dip.  [employer]    8/2/2023    1:15 PM    Future Appointments   Date Time Provider [address]   8/2/2023  1:20 PM Gigi Pineda PT [facility]   8/7/2023 10:00 AM Gigi Pineda PT [facility]   8/9/2023 12:40 PM Gigi Pineda PT [facility]   8/14/2023  1:20 PM Gigi Pineda PT [facility]   8/16/2023  1:20 PM Gigi Pineda, PT [facility]   8/21/2023  1:20 PM Gigi Pineda PT [facility]   8/23/2023 12:00 PM Gigi Pineda PT [facility]   8/28/2023  1:20 PM Gigi Pineda, PT [facility]   8/30/2023  1:20 PM Gigi Miriam, PT [facility]

## 2023-08-07 ENCOUNTER — HOSPITAL ENCOUNTER (OUTPATIENT)
Facility: HOSPITAL | Age: 83
Setting detail: RECURRING SERIES
Discharge: HOME | End: 2023-08-10
Payer: MEDICARE

## 2023-08-07 PROCEDURE — 97530 THERAPEUTIC ACTIVITIES: CPT

## 2023-08-07 PROCEDURE — 97110 THERAPEUTIC EXERCISES: CPT

## 2023-08-07 PROCEDURE — 97112 NEUROMUSCULAR REEDUCATION: CPT

## 2023-08-07 NOTE — PROGRESS NOTES
PHYSICAL / OCCUPATIONAL THERAPY - DAILY TREATMENT NOTE (updated )    Patient Name: Josiah Jones    Date: 2023    : 1940  Insurance: Payor: MEDICARE / Plan: MEDICARE PART A AND B / Product Type: *No Product type* /      Patient  verified yes     Visit #   Current / Total 17 24   Time   In / Out 9:50early 10:30   Pain   In / Out 2 0   Subjective Functional Status/Changes: I saw my great granddaughter over the weekend. She's 6 months old. TREATMENT AREA =  Pain in left leg [M79.605]  Pain in right leg [M79.604]    OBJECTIVE    Therapeutic Procedures: Tx Min Billable or 1:1 Min (if diff from Tx Min) Procedure, Rationale, Specifics   15  47266 Therapeutic Exercise (timed):  increase ROM, strength, coordination, balance, and proprioception to improve patient's ability to progress to PLOF and address remaining functional goals. (see flow sheet as applicable)     Details if applicable:       15  82015 Therapeutic Activity (timed):  use of dynamic activities replicating functional movements to increase ROM, strength, coordination, balance, and proprioception in order to improve patient's ability to progress to PLOF and address remaining functional goals. (see flow sheet as applicable)     Details if applicable:     10  34903 Neuromuscular Re-Education (timed):  improve balance, coordination, kinesthetic sense, posture, core stability and proprioception to improve patient's ability to develop conscious control of individual muscles and awareness of position of extremities in order to progress to PLOF and address remaining functional goals.  (see flow sheet as applicable)     Details if applicable:  RFIstting   36  [facility] Totals Reminder: bill using total billable min of TIMED therapeutic procedures (example: do not include dry needle or estim unattended, both untimed codes, in totals to left)  8-22 min = 1 unit; 23-37 min = 2 units; 38-52 min = 3 units; 53-67 min = 4 units; 68-82 min = 5 units

## 2023-08-09 ENCOUNTER — HOSPITAL ENCOUNTER (OUTPATIENT)
Facility: HOSPITAL | Age: 83
Setting detail: RECURRING SERIES
Discharge: HOME | End: 2023-08-12
Payer: MEDICARE

## 2023-08-09 PROCEDURE — 97530 THERAPEUTIC ACTIVITIES: CPT

## 2023-08-09 PROCEDURE — 97110 THERAPEUTIC EXERCISES: CPT

## 2023-08-09 PROCEDURE — 97112 NEUROMUSCULAR REEDUCATION: CPT

## 2023-08-09 NOTE — PROGRESS NOTES
[address] [facility],6[address] [facility] AT [facility]  [address]   Phone: [phone] Fax: [phone]  PROGRESS NOTE  Patient Name: Grey Martin : 1940   Treatment/Medical Diagnosis: Pain in left leg [M79.605]  Pain in right leg [M79.604]   Referral Source: Susan Willard MD     Date of Initial Visit: 23 Attended Visits: 18 Missed Visits: 0     SUMMARY OF TREATMENT  Patient's POC has consisted of therex, therapeutic activities, manual therapy prn, modalities prn, pt. education, and a comprehensive HEP. Treatment strategies used to address functional mobility deficits, ROM deficits, strength deficits, analyze and address soft tissue restrictions, analyze and cue movement patterns, analyze and modify body mechanics/ergonomics, assess and modify postural abnormalities and instruct in home and community integration. CURRENT STATUS  ***    GOALS/MEASURE OF PROGRESS Goal Met? Patient to be Safe and Independent with HEP to self-manage/prevent symptoms after DC. Patient to increase FS FOTO score to > 59 to indicate increased functional independence.   -Status at IE- FS FOTO score = 35  3. Patient to demonstrate > 15' uninterrupted standing activity tolerance   -Status at IE- unable to tolerate >5' standing due to BLE pain    Ongoing      Ongoing        MET     Medicare, cannot change goals, cannot adjust frequency/duration, no signature required     RECOMMENDATIONS  Continue therapy per initial Plan of Care or most recent Medicare Recert. If you have any questions/comments please contact us directly at [phone]. Thank you for allowing us to assist in the care of your patient. Antelmo Fuchs \"BJ\" ESPERANZA Rogers, Cert. MDT, Cert. DN, Cert. SMT, Dip.  Osteopractic       2023       1:21 PM

## 2023-08-09 NOTE — PROGRESS NOTES
PHYSICAL / OCCUPATIONAL THERAPY - DAILY TREATMENT NOTE (updated )    Patient Name: Khurram Anderson    Date: 2023    : 1940  Insurance: Payor: MEDICARE / Plan: MEDICARE PART A AND B / Product Type: *No Product type* /      Patient  verified yes     Visit #   Current / Total 18 24   Time   In / Out 12:33 1:23   Pain   In / Out 2 0   Subjective Functional Status/Changes: I'm getting used to the cane. TREATMENT AREA =  Pain in left leg [M79.605]  Pain in right leg [M79.604]    OBJECTIVE    Therapeutic Procedures: Tx Min Billable or 1:1 Min (if diff from Tx Min) Procedure, Rationale, Specifics   15  29249 Therapeutic Exercise (timed):  increase ROM, strength, coordination, balance, and proprioception to improve patient's ability to progress to PLOF and address remaining functional goals. (see flow sheet as applicable)     Details if applicable:       15  56439 Therapeutic Activity (timed):  use of dynamic activities replicating functional movements to increase ROM, strength, coordination, balance, and proprioception in order to improve patient's ability to progress to PLOF and address remaining functional goals. (see flow sheet as applicable)     Details if applicable:     10  97525 Neuromuscular Re-Education (timed):  improve balance, coordination, kinesthetic sense, posture, core stability and proprioception to improve patient's ability to develop conscious control of individual muscles and awareness of position of extremities in order to progress to PLOF and address remaining functional goals.  (see flow sheet as applicable)     Details if applicable:     36  [facility] Totals Reminder: bill using total billable min of TIMED therapeutic procedures (example: do not include dry needle or estim unattended, both untimed codes, in totals to left)  8-22 min = 1 unit; 23-37 min = 2 units; 38-52 min = 3 units; 53-67 min = 4 units; 68-82 min = 5 units   Total Total     [x]  Patient Education billed

## 2023-08-14 ENCOUNTER — APPOINTMENT (OUTPATIENT)
Facility: HOSPITAL | Age: 83
End: 2023-08-14
Payer: MEDICARE

## 2023-08-16 ENCOUNTER — HOSPITAL ENCOUNTER (OUTPATIENT)
Facility: HOSPITAL | Age: 83
Setting detail: RECURRING SERIES
End: 2023-08-16
Payer: MEDICARE

## 2023-08-21 ENCOUNTER — HOSPITAL ENCOUNTER (OUTPATIENT)
Facility: HOSPITAL | Age: 83
Setting detail: RECURRING SERIES
Discharge: HOME | End: 2023-08-24
Payer: MEDICARE

## 2023-08-21 PROCEDURE — 97112 NEUROMUSCULAR REEDUCATION: CPT

## 2023-08-21 PROCEDURE — 97530 THERAPEUTIC ACTIVITIES: CPT

## 2023-08-21 PROCEDURE — 97110 THERAPEUTIC EXERCISES: CPT

## 2023-08-21 NOTE — PROGRESS NOTES
PHYSICAL / OCCUPATIONAL THERAPY - DAILY TREATMENT NOTE (updated )    Patient Name: Khurram Anderson    Date: 2023    : 1940  Insurance: Payor: MEDICARE / Plan: MEDICARE PART A AND B / Product Type: *No Product type* /      Patient  verified yes     Visit #   Current / Total 19 24   Time   In / Out 1:21 1:59   Pain   In / Out 2 0   Subjective Functional Status/Changes: I felt my age last week. My thighs were very sore. TREATMENT AREA =  Pain in left leg [M79.605]  Pain in right leg [M79.604]    OBJECTIVE    Therapeutic Procedures: Tx Min Billable or 1:1 Min (if diff from Tx Min) Procedure, Rationale, Specifics   15  11351 Therapeutic Exercise (timed):  increase ROM, strength, coordination, balance, and proprioception to improve patient's ability to progress to PLOF and address remaining functional goals. (see flow sheet as applicable)     Details if applicable:       15  87519 Therapeutic Activity (timed):  use of dynamic activities replicating functional movements to increase ROM, strength, coordination, balance, and proprioception in order to improve patient's ability to progress to PLOF and address remaining functional goals. (see flow sheet as applicable)     Details if applicable:     8  30474 Neuromuscular Re-Education (timed):  improve balance, coordination, kinesthetic sense, posture, core stability and proprioception to improve patient's ability to develop conscious control of individual muscles and awareness of position of extremities in order to progress to PLOF and address remaining functional goals.  (see flow sheet as applicable)     Details if applicable:     45  [facility] Totals Reminder: bill using total billable min of TIMED therapeutic procedures (example: do not include dry needle or estim unattended, both untimed codes, in totals to left)  8-22 min = 1 unit; 23-37 min = 2 units; 38-52 min = 3 units; 53-67 min = 4 units; 68-82 min = 5 units   Total Total   [x] Review HEP

## 2023-08-23 ENCOUNTER — HOSPITAL ENCOUNTER (OUTPATIENT)
Facility: HOSPITAL | Age: 83
Setting detail: RECURRING SERIES
Discharge: HOME | End: 2023-08-26
Payer: MEDICARE

## 2023-08-23 PROCEDURE — 97112 NEUROMUSCULAR REEDUCATION: CPT

## 2023-08-23 PROCEDURE — 97110 THERAPEUTIC EXERCISES: CPT

## 2023-08-23 PROCEDURE — 97530 THERAPEUTIC ACTIVITIES: CPT

## 2023-08-23 NOTE — PROGRESS NOTES
PHYSICAL / OCCUPATIONAL THERAPY - DAILY TREATMENT NOTE (updated )    Patient Name: Carla Norman    Date: 2023    : 1940  Insurance: Payor: MEDICARE / Plan: MEDICARE PART A AND B / Product Type: *No Product type* /      Patient  verified yes     Visit #   Current / Total 20 22-24   Time   In / Out 11:50 12:28   Pain   In / Out 0 0   Subjective Functional Status/Changes: I took a shower this morning and forgot to put my hearing aids back in. TREATMENT AREA =  Pain in left leg [M79.605]  Pain in right leg [M79.604]    OBJECTIVE    Therapeutic Procedures: Tx Min Billable or 1:1 Min (if diff from Tx Min) Procedure, Rationale, Specifics   15  92395 Therapeutic Exercise (timed):  increase ROM, strength, coordination, balance, and proprioception to improve patient's ability to progress to PLOF and address remaining functional goals. (see flow sheet as applicable)     Details if applicable:       15  05430 Therapeutic Activity (timed):  use of dynamic activities replicating functional movements to increase ROM, strength, coordination, balance, and proprioception in order to improve patient's ability to progress to PLOF and address remaining functional goals. (see flow sheet as applicable)     Details if applicable:     8  06482 Neuromuscular Re-Education (timed):  improve balance, coordination, kinesthetic sense, posture, core stability and proprioception to improve patient's ability to develop conscious control of individual muscles and awareness of position of extremities in order to progress to PLOF and address remaining functional goals.  (see flow sheet as applicable)     Details if applicable:     45  [facility] Totals Reminder: bill using total billable min of TIMED therapeutic procedures (example: do not include dry needle or estim unattended, both untimed codes, in totals to left)  8-22 min = 1 unit; 23-37 min = 2 units; 38-52 min = 3 units; 53-67 min = 4 units; 68-82 min = 5 units

## 2023-08-28 ENCOUNTER — HOSPITAL ENCOUNTER (OUTPATIENT)
Facility: HOSPITAL | Age: 83
Setting detail: RECURRING SERIES
Discharge: HOME | End: 2023-08-31
Payer: MEDICARE

## 2023-08-28 PROCEDURE — 97110 THERAPEUTIC EXERCISES: CPT

## 2023-08-28 PROCEDURE — 97112 NEUROMUSCULAR REEDUCATION: CPT

## 2023-08-28 PROCEDURE — 97530 THERAPEUTIC ACTIVITIES: CPT

## 2023-08-28 NOTE — PROGRESS NOTES
[x]  Patient Education billed concurrently with other procedures   [x] Review HEP    [] Progressed/Changed HEP  [] Other:    Objective Information/Functional Measures/Assessment    Increased reps/sets/resistance per flow sheet. Patient will continue to benefit from skilled PT services to modify and progress therapeutic interventions, analyze and address functional mobility deficits, analyze and address ROM deficits, analyze and address strength deficits, analyze and address soft tissue restrictions, analyze and cue for proper movement patterns, and instruct in home and community integration to address functional deficits and attain remaining goals. Progress toward goals / Updated goals:  []  See Progress Note/Recertification    Patient to schedule 2 more sessions beyond what's already scheduled to further establishn independence with therex/HEP    Next PN/ RC due 9/9/23  Auth due 9/8/23    PLAN  yes Continue plan of care  []  Upgrade activities as tolerated  []  Discharge: See DC Note  []  Other:    Mary Perez \"BJ\" SHASHA RogersT, Cert. MDT, Cert. DN, Cert. SMT, Dip.  Osteopractic    8/28/2023    1:23 PM    Future Appointments   Date Time Provider [address]   8/30/2023  1:20 PM Gigi Pineda PT [facility]   9/5/2023 12:40 PM Gigi Pineda PT [facility]   9/6/2023 12:40 [facility]

## 2023-08-30 ENCOUNTER — HOSPITAL ENCOUNTER (OUTPATIENT)
Facility: HOSPITAL | Age: 83
Setting detail: RECURRING SERIES
Discharge: HOME | End: 2023-09-02
Payer: MEDICARE

## 2023-08-30 PROCEDURE — 97110 THERAPEUTIC EXERCISES: CPT

## 2023-08-30 PROCEDURE — 97530 THERAPEUTIC ACTIVITIES: CPT

## 2023-08-30 PROCEDURE — 97112 NEUROMUSCULAR REEDUCATION: CPT

## 2023-08-30 NOTE — PROGRESS NOTES
PHYSICAL / OCCUPATIONAL THERAPY - DAILY TREATMENT NOTE (updated )    Patient Name: Farhan Rae    Date: 2023    : 1940  Insurance: Payor: [address] / Plan: MEDICARE PART A AND B / Product Type: *No Product type* /      Patient  verified yes     Visit #   Current / Total 22 26   Time   In / Out 1:10 1:50   Pain   In / Out 0 0   Subjective Functional Status/Changes: Last night I had a cramp in both of my legs but I got up and it went away in about 5 minutes     TREATMENT AREA =  Pain in left leg [M79.605]  Pain in right leg [M79.604]  Next PN/ RC due 23  Auth due 23    OBJECTIVE    Therapeutic Procedures: Tx Min Billable or 1:1 Min (if diff from Tx Min) Procedure, Rationale, Specifics   15  28801 Therapeutic Exercise (timed):  increase ROM, strength, coordination, balance, and proprioception to improve patient's ability to progress to PLOF and address remaining functional goals. (see flow sheet as applicable)     Details if applicable:       15  45663 Therapeutic Activity (timed):  use of dynamic activities replicating functional movements to increase ROM, strength, coordination, balance, and proprioception in order to improve patient's ability to progress to PLOF and address remaining functional goals. (see flow sheet as applicable)     Details if applicable:     10   Neuromuscular Re-Education (timed):  improve balance, coordination, kinesthetic sense, posture, core stability and proprioception to improve patient's ability to develop conscious control of individual muscles and awareness of position of extremities in order to progress to PLOF and address remaining functional goals.  (see flow sheet as applicable)     Details if applicable:     36  [facility] Totals Reminder: bill using total billable min of TIMED therapeutic procedures (example: do not include dry needle or estim unattended, both untimed codes, in totals to left)  8-22 min = 1 unit; 23-37 min = 2 units; 38-52 min

## 2023-09-05 ENCOUNTER — HOSPITAL ENCOUNTER (OUTPATIENT)
Facility: HOSPITAL | Age: 83
Setting detail: RECURRING SERIES
Discharge: HOME | End: 2023-09-08
Payer: MEDICARE

## 2023-09-05 PROCEDURE — 97530 THERAPEUTIC ACTIVITIES: CPT

## 2023-09-05 PROCEDURE — 97112 NEUROMUSCULAR REEDUCATION: CPT

## 2023-09-05 PROCEDURE — 97110 THERAPEUTIC EXERCISES: CPT

## 2023-09-06 ENCOUNTER — HOSPITAL ENCOUNTER (OUTPATIENT)
Facility: HOSPITAL | Age: 83
Setting detail: RECURRING SERIES
Discharge: HOME | End: 2023-09-09
Payer: MEDICARE

## 2023-09-06 PROCEDURE — 97110 THERAPEUTIC EXERCISES: CPT

## 2023-09-06 PROCEDURE — 97530 THERAPEUTIC ACTIVITIES: CPT

## 2023-09-06 PROCEDURE — 97112 NEUROMUSCULAR REEDUCATION: CPT

## 2023-09-11 ENCOUNTER — HOSPITAL ENCOUNTER (OUTPATIENT)
Facility: HOSPITAL | Age: 83
Setting detail: RECURRING SERIES
Discharge: HOME | End: 2023-09-14
Payer: MEDICARE

## 2023-09-11 PROCEDURE — 97530 THERAPEUTIC ACTIVITIES: CPT

## 2023-09-11 PROCEDURE — 97110 THERAPEUTIC EXERCISES: CPT

## 2023-09-11 PROCEDURE — 97112 NEUROMUSCULAR REEDUCATION: CPT

## 2023-09-11 NOTE — PROGRESS NOTES
PHYSICAL / OCCUPATIONAL THERAPY - DAILY TREATMENT NOTE (updated )    Patient Name: Hector Soto    Date: 2023    : 1940  Insurance: Payor: MEDICARE / Plan: MEDICARE PART A AND B / Product Type: *No Product type* /      Patient  verified yes     Visit #   Current / Total 25 26   Time   In / Out 1:10 2:03   Pain   In / Out 0 0   Subjective Functional Status/Changes: I'm doing ok. Stayed inside because of the rain. TREATMENT AREA =  Pain in left leg [M79.605]  Pain in right leg [M79.604]    Next PN/ RC due 23  Auth due 23    OBJECTIVE    Therapeutic Procedures: Tx Min Billable or 1:1 Min (if diff from Tx Min) Procedure, Rationale, Specifics   30  60546 Therapeutic Exercise (timed):  increase ROM, strength, coordination, balance, and proprioception to improve patient's ability to progress to PLOF and address remaining functional goals. (see flow sheet as applicable)     Details if applicable:       15  30753 Therapeutic Activity (timed):  use of dynamic activities replicating functional movements to increase ROM, strength, coordination, balance, and proprioception in order to improve patient's ability to progress to PLOF and address remaining functional goals. (see flow sheet as applicable)     Details if applicable:     8  45382 Neuromuscular Re-Education (timed):  improve balance, coordination, kinesthetic sense, posture, core stability and proprioception to improve patient's ability to develop conscious control of individual muscles and awareness of position of extremities in order to progress to PLOF and address remaining functional goals.  (see flow sheet as applicable)     Details if applicable:     48  [facility] Totals Reminder: bill using total billable min of TIMED therapeutic procedures (example: do not include dry needle or estim unattended, both untimed codes, in totals to left)  8-22 min = 1 unit; 23-37 min = 2 units; 38-52 min = 3 units; 53-67 min = 4 units; 68-82

## 2023-09-13 ENCOUNTER — HOSPITAL ENCOUNTER (OUTPATIENT)
Facility: HOSPITAL | Age: 83
Setting detail: RECURRING SERIES
Discharge: HOME | End: 2023-09-16
Payer: MEDICARE

## 2023-09-13 PROCEDURE — 97112 NEUROMUSCULAR REEDUCATION: CPT

## 2023-09-13 PROCEDURE — 97535 SELF CARE MNGMENT TRAINING: CPT

## 2023-09-13 PROCEDURE — 97110 THERAPEUTIC EXERCISES: CPT

## 2023-09-13 PROCEDURE — 97530 THERAPEUTIC ACTIVITIES: CPT

## 2023-09-13 NOTE — PROGRESS NOTES
patient's ability to progress to PLOF and address remaining functional goals. (see flow sheet as applicable)     Details if applicable:     48  [facility] Totals Reminder: bill using total billable min of TIMED therapeutic procedures (example: do not include dry needle or estim unattended, both untimed codes, in totals to left)  8-22 min = 1 unit; 23-37 min = 2 units; 38-52 min = 3 units; 53-67 min = 4 units; 68-82 min = 5 units   Total Total   [x]  Patient Education billed concurrently with other procedures   [x] Review HEP    [] Progressed/Changed HEP  [] Other:    Objective Information/Functional Measures/Assessment    See DC    Progress toward goals / Updated goals:  []  See Progress Note/Recertification    See DC      PLAN  yes Continue plan of care  []  Upgrade activities as tolerated  []  Discharge: See DC Note  []  Other:    Lyal Wesley \"BJ\" ESPERANAZ Rogers, Cert. MDT, Cert. DN, Cert. SMT, Dip. Osteopractic    9/13/2023    1:37 PM    No future appointments.

## 2023-09-13 NOTE — PROGRESS NOTES
8/9/23-9/13/23 6/9/23-9/13/23 Time: 1:54 PM     NOTE TO PHYSICIAN:  PLEASE COMPLETE THE ORDERS BELOW AND FAX TO   [facility]: [phone]  If you are unable to process this request in 24 hours please contact our office: [phone]    ___ I have read the above report and request that my patient continue as recommended.   ___ I have read the above report and request that my patient continue therapy with the following changes/special instructions:_________________________________________________________   ___ I have read the above report and request that my patient be discharged from therapy.      Physician Signature:        Date:     Time:

## 2025-03-30 NOTE — PROGRESS NOTES
PHYSICAL / OCCUPATIONAL THERAPY - DAILY TREATMENT NOTE (updated )    Patient Name: Ann Galeana    Date: 2023    : 1940  Insurance: Payor: Lucy Montes / Plan: MEDICARE PART A AND B / Product Type: *No Product type* /      Patient  verified yes     Visit #   Current / Total 24 26   Time   In / Out 12:40 1:18   Pain   In / Out 0 0   Subjective Functional Status/Changes: I think next week is my last week     TREATMENT AREA =  Pain in left leg [M79.605]  Pain in right leg [M79.604]    Next PN/ RC due 23  Auth due 23    OBJECTIVE    Therapeutic Procedures: Tx Min Billable or 1:1 Min (if diff from Tx Min) Procedure, Rationale, Specifics   15  49745 Therapeutic Exercise (timed):  increase ROM, strength, coordination, balance, and proprioception to improve patient's ability to progress to PLOF and address remaining functional goals. (see flow sheet as applicable)     Details if applicable:       15  03623 Therapeutic Activity (timed):  use of dynamic activities replicating functional movements to increase ROM, strength, coordination, balance, and proprioception in order to improve patient's ability to progress to PLOF and address remaining functional goals. (see flow sheet as applicable)     Details if applicable:     8  89204 Neuromuscular Re-Education (timed):  improve balance, coordination, kinesthetic sense, posture, core stability and proprioception to improve patient's ability to develop conscious control of individual muscles and awareness of position of extremities in order to progress to PLOF and address remaining functional goals.  (see flow sheet as applicable)     Details if applicable:     45  [facility] Totals Reminder: bill using total billable min of TIMED therapeutic procedures (example: do not include dry needle or estim unattended, both untimed codes, in totals to left)  8-22 min = 1 unit; 23-37 min = 2 units; 38-52 min = 3 units; 53-67 min = 4 units; 68-82 min = 5 units
This document is complete and the patient is ready for discharge.